# Patient Record
Sex: MALE | Race: WHITE | NOT HISPANIC OR LATINO | Employment: OTHER | URBAN - METROPOLITAN AREA
[De-identification: names, ages, dates, MRNs, and addresses within clinical notes are randomized per-mention and may not be internally consistent; named-entity substitution may affect disease eponyms.]

---

## 2017-03-17 ENCOUNTER — HOSPITAL ENCOUNTER (EMERGENCY)
Facility: CLINIC | Age: 47
Discharge: HOME | End: 2017-03-17
Attending: EMERGENCY MEDICINE | Admitting: EMERGENCY MEDICINE
Payer: MEDICARE

## 2017-03-17 VITALS
HEIGHT: 73 IN | SYSTOLIC BLOOD PRESSURE: 107 MMHG | OXYGEN SATURATION: 97 % | HEART RATE: 81 BPM | WEIGHT: 160.56 LBS | RESPIRATION RATE: 16 BRPM | DIASTOLIC BLOOD PRESSURE: 74 MMHG | TEMPERATURE: 98.2 F | BODY MASS INDEX: 21.28 KG/M2

## 2017-03-17 DIAGNOSIS — H00.014 HORDEOLUM EXTERNUM OF LEFT UPPER EYELID: ICD-10-CM

## 2017-03-17 PROCEDURE — 99283 EMERGENCY DEPT VISIT LOW MDM: CPT | Mod: Z6 | Performed by: EMERGENCY MEDICINE

## 2017-03-17 PROCEDURE — 99282 EMERGENCY DEPT VISIT SF MDM: CPT | Performed by: EMERGENCY MEDICINE

## 2017-03-17 ASSESSMENT — ENCOUNTER SYMPTOMS
EYE REDNESS: 0
COLOR CHANGE: 0
ABDOMINAL PAIN: 0
HEADACHES: 0
NECK STIFFNESS: 0
CONFUSION: 0
DIFFICULTY URINATING: 0
ARTHRALGIAS: 0
FEVER: 0
SHORTNESS OF BREATH: 0

## 2017-03-17 ASSESSMENT — VISUAL ACUITY
OD: 20/40
OS: 20/25

## 2017-03-17 NOTE — ED AVS SNAPSHOT
[facility], Emergency Department    [address]    [facility] [address]    Phone:  [phone]    Fax:  [phone]                                       Brendan Collins   MRN: [id number]    Department:  [facility], Emergency Department   Date of Visit:  3/17/2017           After Visit Summary Signature Page     I have received my discharge instructions, and my questions have been answered. I have discussed any challenges I see with this plan with the nurse or doctor.    ..........................................................................................................................................  Patient/Patient Representative Signature      ..........................................................................................................................................  Patient Representative Print Name and Relationship to Patient    ..................................................               ................................................  Date                                            Time    ..........................................................................................................................................  Reviewed by Signature/Title    ...................................................              ..............................................  Date                                                            Time

## 2017-03-17 NOTE — ED PROVIDER NOTES
"  History     Chief Complaint   Patient presents with     Eye Problem     red and puss filled eyelid     HPI  Brendan Collins is a 46 year old male who presents for evaluation of a right eye problem. The patient presents with his female partner today. The patient reports that over the past couple of weeks he developed a small, red, \"crusty\" lesion on his upper left eye lid. On Wednesday, 2 days ago, the lesion was larger and cystic-appearing. He applied heat to the area and took an acyclovir that he had from previous bouts of cold sores (no history of herpes of the eyes, though). Today, the lesion opened and began to drain purulent material. The patient endorses some blurred vision out of the left eye. The patient denies any trauma to his left eye. He does report a history of stye-like lesions in the right eye, and has had surgery to remove this previously, with resultant right eyelid akinesia.    I have reviewed the Medications, Allergies, Past Medical and Surgical History, and Social History in the Epic system.    No current facility-administered medications for this encounter.      Current Outpatient Prescriptions   Medication     nicotine (NICOTROL) 10 MG/ML SOLN inhalation solution     clonazePAM (KLONOPIN) 0.5 MG tablet     diazepam (VALIUM) 5 MG tablet     sucralfate (CARAFATE) 1 GM tablet     SUMAtriptan Succinate (IMITREX PO)     Cholecalciferol (VITAMIN D3 PO)     buprenorphine HCl-naloxone HCl (SUBOXONE) 8-2 MG film     DiphenhydrAMINE HCl (BENADRYL PO)     Past Medical History   Diagnosis Date     Anxiety      Bipolar 1 disorder (H)      Depressive disorder      Hep C w/o coma, chronic (H)        Past Surgical History   Procedure Laterality Date     Arthroscopy knee  2/1/2012     Procedure:ARTHROSCOPY KNEE; Left Knee Arthroscopy, Partial Lateral Meniscectomy; Surgeon:ELIJAH LY; Location:[facility] OR     Arthroplasty knee Right 11/9/2015     Procedure: ARTHROPLASTY KNEE;  Surgeon: Efrain, " Yajaira MONTE MD;  Location: [facility] OR       Family History   Problem Relation Age of Onset     Glaucoma No family hx of      Macular Degeneration No family hx of      Thyroid Disease No family hx of      Eye Surgery No family hx of      Retinal detachment No family hx of      Amblyopia No family hx of      Strabismus No family hx of        Social History   Substance Use Topics     Smoking status: Current Some Day Smoker     Smokeless tobacco: Never Used     Alcohol use Yes      Comment: on weekends,Last dran was last night        Allergies   Allergen Reactions     Droperidol Other (See Comments)     Spine turns to right and gets stiff, unable to move     Haloperidol Lactate Other (See Comments)     Spine turns to right becomes stiff and is unable to move     Nefazodone Difficulty breathing     Rofecoxib Difficulty breathing     Trazodone Difficulty breathing     Lamictal Starter Rash     Naproxen Nausea and Vomiting     Gabapentin Other (See Comments)     Migraine and shaking       Review of Systems   Constitutional: Negative for fever.   HENT: Negative for congestion.    Eyes: Negative for redness.        Stye-like lesion of left upper eyelid, draining, associated with mild blurry vision   Respiratory: Negative for shortness of breath.    Cardiovascular: Negative for chest pain.   Gastrointestinal: Negative for abdominal pain.   Genitourinary: Negative for difficulty urinating.   Musculoskeletal: Negative for arthralgias and neck stiffness.   Skin: Negative for color change.   Neurological: Negative for headaches.   Psychiatric/Behavioral: Negative for confusion.   All other systems reviewed and are negative.      Physical Exam      Physical Exam   Eyes: Left eye exhibits hordeolum (currently draining).       ED Course     ED Course     Procedures               Labs Ordered and Resulted from Time of ED Arrival Up to the Time of Departure from the ED - No data to display    Assessments & Plan (with Medical Decision  Making)   46-year-old male presents for evaluation of 2 day history of left upper eyelid redness, swelling which apparently spontaneously started draining as he walked into the emergency room.  He has classic findings of a sty which is currently draining without evidence of cellulitis.  I recommended continued use of warm compresses until drainage stops.  I have also recommended follow-up if redness and swelling begins to spread to include adjacent left upper eyelid region.    I have reviewed the nursing notes.    I have reviewed the findings, diagnosis, plan and need for follow up with the patient.    Discharge Medication List as of 3/17/2017 11:06 AM          Final diagnoses:   Hordeolum externum of left upper eyelid     IAshwin, am serving as a trained medical scribe to document services personally performed by Boubacar Lubin MD, based on the provider's statements to me.      Boubacar GENTILE MD, was physically present and have reviewed and verified the accuracy of this note documented by Ashwin Ulrich.    3/17/2017   [facility], [place], EMERGENCY DEPARTMENT     Boubacar Lubin MD  03/17/17 5123

## 2017-03-17 NOTE — DISCHARGE INSTRUCTIONS
Sty (or Stye)  A sty is an infection of the oil gland of the eyelid. It may develop into a small pocket of pus (abscess). This can cause pain, redness, and swelling. In early stages, styes are treated with antibiotic cream, eye drops, or warm packs (small towels soaked in warm water). More severe cases may need to be opened and drained by a health care provider.  Home care    Eye drops or ointment are usually prescribed to treat the infection. Use these as directed.     Artificial tears may also be used to lubricate the eye and make it more comfortable. These may be purchased without a prescription.     Talk to your health care provider before using any over-the-counter treatment for a sty.    Apply a warm, damp towel to the affected eye for at least 5 minutes, 3 to 4 times a day for a week. Warm compresses open the pores and speed the healing. If the compresses are too hot, they may burn your eyelid.    Sometimes the sty will drain with this treatment alone. If this happens, continue the antibiotic until all the redness and swelling are gone.    Wash your hands before and after touching the infected eye to avoid spreading the infection.    Do not squeeze or try to puncture the sty.  Follow-up care  Follow up with your health care provider, or as advised.   When to seek medical advice  Call your health care provider right away if you have:    Increase in swelling or redness around the eyelid after 48 to 72 hours    Increase in eye pain or the eyelid blisters    Increase in warmth--the eyelid feels hot    Drainage of blood or thick pus from the sty    Blister on the eyelid    Inability to open the eyelid due to swelling    Fever    1 degree above your normal temperature lasting for 24 to 48 hours, or    Whatever your health care provider told you to report based on your medical condition    Vision changes    Headache or stiff neck    Recurrence of the sty    7289-2211 [employer]. [address], [place] [address]. All rights reserved. This information is not intended as a substitute for professional medical care. Always follow your healthcare professional's instructions.

## 2017-03-17 NOTE — ED AVS SNAPSHOT
[facility], Emergency Department    [address]    [facility] [address]    Phone:  [phone]    Fax:  [phone]                                       Brendan Collins   MRN: [id number]    Department:  [facility], Emergency Department   Date of Visit:  3/17/2017           Patient Information     Date Of Birth          1970        Your diagnoses for this visit were:     Hordeolum externum of left upper eyelid        You were seen by Boubacar Lubin MD.      Follow-up Information     Follow up with Kiah Hightower MD.    Specialty:  Family Practice    Contact information:    [facility]  [address] [id number] [phone]          Discharge Instructions         Sty (or Stye)  A sty is an infection of the oil gland of the eyelid. It may develop into a small pocket of pus (abscess). This can cause pain, redness, and swelling. In early stages, styes are treated with antibiotic cream, eye drops, or warm packs (small towels soaked in warm water). More severe cases may need to be opened and drained by a health care provider.  Home care    Eye drops or ointment are usually prescribed to treat the infection. Use these as directed.     Artificial tears may also be used to lubricate the eye and make it more comfortable. These may be purchased without a prescription.     Talk to your health care provider before using any over-the-counter treatment for a sty.    Apply a warm, damp towel to the affected eye for at least 5 minutes, 3 to 4 times a day for a week. Warm compresses open the pores and speed the healing. If the compresses are too hot, they may burn your eyelid.    Sometimes the sty will drain with this treatment alone. If this happens, continue the antibiotic until all the redness and swelling are gone.    Wash your hands before and after touching the infected eye to avoid spreading the infection.    Do not squeeze or try to puncture the sty.  Follow-up  care  Follow up with your health care provider, or as advised.   When to seek medical advice  Call your health care provider right away if you have:    Increase in swelling or redness around the eyelid after 48 to 72 hours    Increase in eye pain or the eyelid blisters    Increase in warmth--the eyelid feels hot    Drainage of blood or thick pus from the sty    Blister on the eyelid    Inability to open the eyelid due to swelling    Fever    1 degree above your normal temperature lasting for 24 to 48 hours, or    Whatever your health care provider told you to report based on your medical condition    Vision changes    Headache or stiff neck    Recurrence of the sty    0177-9250 [employer]. [address] [place] [address]. All rights reserved. This information is not intended as a substitute for professional medical care. Always follow your healthcare professional's instructions.          24 Hour Appointment Hotline       To make an appointment at any [facility], call [phone] ([phone]). If you don't have a family doctor or clinic, we will help you find one. [facility] [place] clinics are conveniently located to serve the needs of you and your family.             Review of your medicines      Our records show that you are taking the medicines listed below. If these are incorrect, please call your family doctor or clinic.        Dose / Directions Last dose taken    BENADRYL PO   Dose:   mg        Take  mg by mouth nightly as needed   Refills:  0        buprenorphine HCl-naloxone HCl 8-2 MG per film   Commonly known as:  SUBOXONE   Dose:  2 Film        Place 2 Film under the tongue daily   Refills:  0        clonazePAM 0.5 MG tablet   Commonly known as:  klonoPIN   Dose:  0.5 mg        Take 0.5 mg by mouth nightly as needed (sleep)   Refills:  0        diazepam 5 MG tablet   Commonly known as:  VALIUM   Dose:  5 mg        Take 5 mg by mouth every 8 hours as needed for  "anxiety   Refills:  0        IMITREX PO   Dose:  100 mg        Take 100 mg by mouth daily as needed for migraine (May take 1 more tablet 2 hours after first dose if no relief)   Refills:  0        nicotine 10 MG/ML Soln inhalation solution   Commonly known as:  NICOTROL   Dose:  1 spray   Quantity:  1 Bottle        Spray 1 spray in nostril every hour as needed for smoking cessation   Refills:  1        sucralfate 1 GM tablet   Commonly known as:  CARAFATE   Dose:  1 g        Take 1 g by mouth 4 times daily as needed   Refills:  0        VITAMIN D3 PO   Dose:  5000 Units        Take 5,000 Units by mouth daily   Refills:  0                Orders Needing Specimen Collection     None      Pending Results     No orders found from 3/15/2017 to 3/18/2017.            Pending Culture Results     No orders found from 3/15/2017 to 3/18/2017.            Thank you for choosing [facility]       Thank you for choosing [facility] for your care. Our goal is always to provide you with excellent care. Hearing back from our patients is one way we can continue to improve our services. Please take a few minutes to complete the written survey that you may receive in the mail after you visit with us. Thank you!        [employer] Information     [employer] lets you send messages to your doctor, view your test results, renew your prescriptions, schedule appointments and more. To sign up, go to [email or web address] . Click on \"Log in\" on the left side of the screen, which will take you to the Welcome page. Then click on \"Sign up Now\" on the right side of the page.     You will be asked to enter the access code listed below, as well as some personal information. Please follow the directions to create your username and password.     Your access code is: [email or web address]  Expires: 3/17/2017  2:37 PM     Your access code will  in 90 days. If you need help or a new code, please call your [facility] clinic or [phone].        Care EveryWhere ID     " This is your Care EveryWhere ID. This could be used by other organizations to access your [facility] medical records  [id number]        After Visit Summary       This is your record. Keep this with you and show to your community pharmacist(s) and doctor(s) at your next visit.

## 2017-05-24 ENCOUNTER — HOSPITAL ENCOUNTER (INPATIENT)
Facility: CLINIC | Age: 47
LOS: 8 days | Discharge: HOME | DRG: 885 | End: 2017-06-02
Attending: PSYCHIATRY & NEUROLOGY | Admitting: PSYCHIATRY & NEUROLOGY
Payer: MEDICARE

## 2017-05-24 DIAGNOSIS — M25.562 CHRONIC PAIN OF BOTH KNEES: Primary | ICD-10-CM

## 2017-05-24 DIAGNOSIS — M25.561 CHRONIC PAIN OF BOTH KNEES: Primary | ICD-10-CM

## 2017-05-24 DIAGNOSIS — S06.9X0S TRAUMATIC BRAIN INJURY, WITHOUT LOSS OF CONSCIOUSNESS, SEQUELA (H): ICD-10-CM

## 2017-05-24 DIAGNOSIS — F43.10 PTSD (POST-TRAUMATIC STRESS DISORDER): ICD-10-CM

## 2017-05-24 DIAGNOSIS — G89.29 CHRONIC PAIN OF BOTH KNEES: Primary | ICD-10-CM

## 2017-05-24 DIAGNOSIS — F19.20 CHEMICAL DEPENDENCY (H): ICD-10-CM

## 2017-05-24 DIAGNOSIS — K59.01 SLOW TRANSIT CONSTIPATION: ICD-10-CM

## 2017-05-24 LAB
AMPHETAMINES UR QL SCN: ABNORMAL
BARBITURATES UR QL: ABNORMAL
BENZODIAZ UR QL: ABNORMAL
CANNABINOIDS UR QL SCN: ABNORMAL
COCAINE UR QL: ABNORMAL
ETHANOL UR QL SCN: ABNORMAL
OPIATES UR QL SCN: ABNORMAL

## 2017-05-24 PROCEDURE — 99285 EMERGENCY DEPT VISIT HI MDM: CPT | Mod: Z6 | Performed by: PSYCHIATRY & NEUROLOGY

## 2017-05-24 PROCEDURE — 99285 EMERGENCY DEPT VISIT HI MDM: CPT | Mod: 25 | Performed by: PSYCHIATRY & NEUROLOGY

## 2017-05-24 PROCEDURE — 90791 PSYCH DIAGNOSTIC EVALUATION: CPT

## 2017-05-24 PROCEDURE — 80307 DRUG TEST PRSMV CHEM ANLYZR: CPT | Performed by: PSYCHIATRY & NEUROLOGY

## 2017-05-24 PROCEDURE — 80320 DRUG SCREEN QUANTALCOHOLS: CPT | Performed by: PSYCHIATRY & NEUROLOGY

## 2017-05-24 ASSESSMENT — ENCOUNTER SYMPTOMS
CARDIOVASCULAR NEGATIVE: 1
CONSTITUTIONAL NEGATIVE: 1
MUSCULOSKELETAL NEGATIVE: 1
NERVOUS/ANXIOUS: 1
SLEEP DISTURBANCE: 1
RESPIRATORY NEGATIVE: 1
HEMATOLOGIC/LYMPHATIC NEGATIVE: 1
CONFUSION: 1
EYES NEGATIVE: 1
DECREASED CONCENTRATION: 1
GASTROINTESTINAL NEGATIVE: 1

## 2017-05-24 NOTE — IP AVS SNAPSHOT
[address] [address]    Phone:  [phone]                                       After Visit Summary   5/24/2017    Brendan Collins    MRN: [id number]           After Visit Summary Signature Page     I have received my discharge instructions, and my questions have been answered. I have discussed any challenges I see with this plan with the nurse or doctor.    ..........................................................................................................................................  Patient/Patient Representative Signature      ..........................................................................................................................................  Patient Representative Print Name and Relationship to Patient    ..................................................               ................................................  Date                                            Time    ..........................................................................................................................................  Reviewed by Signature/Title    ...................................................              ..............................................  Date                                                            Time

## 2017-05-24 NOTE — IP AVS SNAPSHOT
MRN:[id number]                      After Visit Summary   5/24/2017    Brendan Collins    MRN: [id number]           Thank you!     Thank you for choosing [facility] for your care. Our goal is always to provide you with excellent care.        Patient Information     Date Of Birth          1970        Designated Caregiver       Most Recent Value    Caregiver    Will someone help with your care after discharge? no      About your hospital stay     You were admitted on:  May 25, 2017 You last received care in the:  [facility]    You were discharged on:  June 2, 2017       Who to Call     For medical emergencies, please call 911.  For non-urgent questions about your medical care, please call your primary care provider or clinic, [phone]          Attending Provider     Provider Specialty    Tung Tavarez MD Psychiatry    [facility], Alex CLAROS MD Psychiatry    [facility], Herb Tamayo MD Psychiatry       Primary Care Provider Office Phone # Fax #    Kiah Hightower [phone]      Additional Services     NEUROPSYCHOLOGY REFERRAL       Your provider has referred you to:    [facility]    All scheduling is subject to the client's specific insurance plan & benefits, provider/location availability, and provider clinical specialities.  Please arrive 15 minutes early for your first appointment and bring your completed paperwork.    Please be aware that coverage of these services is subject to the terms and limitations of your health insurance plan.  Call member services at your health plan with any benefit or coverage questions.    Please bring the following to your appointment:  >>   Any x-rays, CTs or MRIs which have been performed.  Contact the facility where they were done to arrange for  prior to your scheduled appointment.  Any new CT, MRI or other procedures ordered by your specialist must be performed at a [facility] facility or coordinated by your  clinic's referral office.    >>   List of current medications   >>   This referral request   >>   Any documents/labs given to you for this referral                  Further instructions from your care team       Behavioral Discharge Planning and Instructions    Summary:  You presented to the Emergency room with memory loss and exhibiting dangerous behaviors. You were evaluated by the emergency room and subsequently admitted to [facility] for monitoring, assessment and stabilization.  While admitted your symptoms improved. You are now denying thoughts of harming yourself or others and are being discharged to follow up with outpatient providers.    Main Diagnosis:   1.  Diagnosis of bipolar.  Mood stabilizers did not work.      2.  Chemical dependency; polymorphic.    3.  Cluster B personality.    4.  PTSD.    5.  Traumatic brain injury with multiple concussions.    Major Treatments, Procedures and Findings: You had the opportunity to participate in a therapeutic milieu, you were also given the opportunity to participate in groups to increase positive coping skills, you were evaluated and your mental health was assessed and managed by a psychiatrist, your medications were evaluated and adjusted as needed. Follow up appointments were made or referrals were given for you to make appointments. Various resources regarding your mental health symptoms were provided.    Symptoms to Report: Feeling more aggressive, increased confusion, losing more sleep, mood getting worse or thoughts of suicide    Lifestyle Adjustment: Adjust your lifestyle to get enough sleep, relaxation, exercise and  good nutrition. Continue to develop healthy coping skills to decrease stress and promote a healthy living environment. No use of alcohol, illegal drugs or addictive medications other than what is currently prescribed. Attend all your appointments and take your medications as prescribed.    Psychiatry Follow-up: Monday, June 5th at 9:00 am    [facility]   [address] [place] [address]. [phone] Fax: [phone]  *[facility] was unable to switch your provider to Latosha Cooper as we discussed, so at this appointment you need to request a new psychiatric provider if you would still like to change providers.                                                    Therapy Follow up: Wednesday, June 14th at 9:00 am and Wednesday, June 21st at 9:00 am   [facility]  [address]  [address] [place] [address] [phone]     Initial Neuropsychological Evaluation: Wednesday, July 14th at 8:30 am   [facility]   Dr. Rush Islas   [address] [place] [address] [phone]   *Psychological clinic is located on the 2nd floor     Resources:   Crisis Intervention: [phone] or 966-197-7204 (TTY: 117.619.7167).  Call anytime for help.  National [place] on Mental Illness (www.[place].oseas.org): 340.290.7364 or 874-410-6981.  Alcoholics Anonymous (www.alcoholics-anonymous.org): Check your phone book for your local chapter.  Suicide Awareness Voices of Education (SAVE) (www.save.org): 290-289-MLLI (0930)  National Suicide Prevention Line (www.mentalhealthmn.org): 168-839-SWAS (0733)  Mental Health Consumer/Survivor Network of [place] (www.mhcsn.net): 231.854.7771 or 697-085-2054  Mental Health Association of [place] (www.mentalhealth.org): 445.322.3022 or 754-851-9471  **[facility] ADULT MENTAL HEALTH CRISIS COPE TEAM: [phone]**    General Medication Instructions:   See your medication sheet(s) for instructions.   Take all medicines as directed.  Make no changes unless your doctor suggests them.   Go to all your doctor visits.  Be sure to have all your required lab tests. This way, your medicines can be refilled on time.  Do not use any drugs not prescribed by your doctor.  Avoid alcohol.    The treatment team has appreciated the opportunity to work with you.  We  "wish you the best in the future. If you have any questions or concerns our unit number is [phone].    Pending Results     No orders found from 2017 to 2017.            Statement of Approval     Ordered          17 1133  I have reviewed and agree with all the recommendations and orders detailed in this document.  EFFECTIVE NOW     Approved and electronically signed by:  Herb Buchanan MD           17 4423  I have reviewed and agree with all the recommendations and orders detailed in this document.  EFFECTIVE NOW     Approved and electronically signed by:  Herb Buchanan MD             Admission Information     Date & Time Provider Department Dept. Phone    2017 Herb Buchanan MD  [facility] [phone]      Your Vitals Were     Blood Pressure Pulse Temperature Respirations Height Weight    145/96 73 97.4  F (36.3  C) 16 1.854 m (6' 1\") 68 kg (149 lb 14.4 oz)    Pulse Oximetry BMI (Body Mass Index)                100% 19.78 kg/m2          [employer] Information     H5 lets you send messages to your doctor, view your test results, renew your prescriptions, schedule appointments and more. To sign up, go to [email or web address] . Click on \"Log in\" on the left side of the screen, which will take you to the Welcome page. Then click on \"Sign up Now\" on the right side of the page.     You will be asked to enter the access code listed below, as well as some personal information. Please follow the directions to create your username and password.     Your access code is: [email or web address]  Expires: 2017  9:50 AM     Your access code will  in 90 days. If you need help or a new code, please call your [facility] clinic or [phone].        Care EveryWhere ID     This is your Care EveryWhere ID. This could be used by other organizations to access your [facility] medical records  [id number]           Review of your medicines      START taking        Dose / Directions    " aspirin  MG EC tablet   Used for:  Chronic pain of both knees        Dose:  650 mg   Take 2 tablets (650 mg) by mouth 2 times daily as needed for moderate pain   Quantity:  60 tablet   Refills:  1       diphenhydrAMINE-zinc acetate cream   Commonly known as:  BENADRYL        Apply topically 3 times daily as needed for itching   Refills:  0       hydrOXYzine 25 MG tablet   Commonly known as:  ATARAX   Used for:  PTSD (post-traumatic stress disorder)        Dose:  25-50 mg   Take 1-2 tablets (25-50 mg) by mouth every 4 hours as needed for itching or anxiety   Quantity:  60 tablet   Refills:  1         CONTINUE these medicines which may have CHANGED, or have new prescriptions. If we are uncertain of the size of tablets/capsules you have at home, strength may be listed as something that might have changed.        Dose / Directions    buprenorphine 8 MG Subl sublingual tablet   Commonly known as:  SUBUTEX   This may have changed:    - medication strength  - additional instructions        Dose:  16 mg   Place 2 tablets (16 mg) under the tongue daily   Quantity:  60 each   Refills:  0         CONTINUE these medicines which have NOT CHANGED        Dose / Directions    BENADRYL PO        Dose:  25-75 mg   Take 25-75 mg by mouth nightly as needed   Refills:  0       diazepam 5 MG tablet   Commonly known as:  VALIUM   Used for:  PTSD (post-traumatic stress disorder)        Dose:  5 mg   Take 1 tablet (5 mg) by mouth every 8 hours as needed for anxiety   Quantity:  14 tablet   Refills:  0       IMITREX PO        Dose:  100 mg   Take 100 mg by mouth daily as needed for migraine (May take 1 more tablet 2 hours after first dose if no relief)   Refills:  0       nicotine 10 MG/ML Soln inhalation solution   Commonly known as:  NICOTROL   Used for:  Nicotine use disorder        Dose:  1 spray   Spray 1 spray in nostril every hour as needed for smoking cessation   Quantity:  1 Bottle   Refills:  1       senna-docusate 8.6-50 MG  per tablet   Commonly known as:  SENOKOT-S;PERICOLACE   Used for:  Slow transit constipation        Dose:  2 tablet   Take 2 tablets by mouth daily   Quantity:  60 tablet   Refills:  1       sucralfate 1 GM tablet   Commonly known as:  CARAFATE        Dose:  1 g   Take 1 g by mouth 4 times daily as needed   Refills:  0       VITAMIN D3 PO        Dose:  5000 Units   Take 5,000 Units by mouth three times a week   Refills:  0         STOP taking     clonazePAM 0.5 MG tablet   Commonly known as:  klonoPIN                Where to get your medicines      These medications were sent to [facility] [place], [place] - [address]  [address], [facility] [address]     Phone:  [phone]     aspirin  MG EC tablet    hydrOXYzine 25 MG tablet    senna-docusate 8.6-50 MG per tablet         Some of these will need a paper prescription and others can be bought over the counter. Ask your nurse if you have questions.     Bring a paper prescription for each of these medications     diazepam 5 MG tablet                Protect others around you: Learn how to safely use, store and throw away your medicines at www.disposemymeds.org.             Medication List: This is a list of all your medications and when to take them. Check marks below indicate your daily home schedule. Keep this list as a reference.      Medications           Morning Afternoon Evening Bedtime As Needed    aspirin  MG EC tablet   Take 2 tablets (650 mg) by mouth 2 times daily as needed for moderate pain   Last time this was given:  650 mg on 6/2/2017 12:10 AM                                BENADRYL PO   Take 25-75 mg by mouth nightly as needed   Last time this was given:  75 mg on 6/1/2017  9:14 PM                                buprenorphine 8 MG Subl sublingual tablet   Commonly known as:  SUBUTEX   Place 2 tablets (16 mg) under the tongue daily   Last time this was given:  16 mg on 6/2/2017  8:20 AM                                 diazepam 5 MG tablet   Commonly known as:  VALIUM   Take 1 tablet (5 mg) by mouth every 8 hours as needed for anxiety   Last time this was given:  5 mg on 6/2/2017  8:20 AM                                diphenhydrAMINE-zinc acetate cream   Commonly known as:  BENADRYL   Apply topically 3 times daily as needed for itching                                hydrOXYzine 25 MG tablet   Commonly known as:  ATARAX   Take 1-2 tablets (25-50 mg) by mouth every 4 hours as needed for itching or anxiety   Last time this was given:  25 mg on 6/2/2017 10:17 AM                                IMITREX PO   Take 100 mg by mouth daily as needed for migraine (May take 1 more tablet 2 hours after first dose if no relief)   Last time this was given:  100 mg on 5/29/2017  3:41 AM                                nicotine 10 MG/ML Soln inhalation solution   Commonly known as:  NICOTROL   Spray 1 spray in nostril every hour as needed for smoking cessation                                senna-docusate 8.6-50 MG per tablet   Commonly known as:  SENOKOT-S;PERICOLACE   Take 2 tablets by mouth daily   Last time this was given:  1 tablet on 6/2/2017  8:20 AM                                sucralfate 1 GM tablet   Commonly known as:  CARAFATE   Take 1 g by mouth 4 times daily as needed                                VITAMIN D3 PO   Take 5,000 Units by mouth three times a week   Last time this was given:  5,000 Units on 6/2/2017  8:20 AM

## 2017-05-25 ENCOUNTER — APPOINTMENT (OUTPATIENT)
Dept: GENERAL RADIOLOGY | Facility: CLINIC | Age: 47
DRG: 885 | End: 2017-05-25
Attending: PHYSICIAN ASSISTANT
Payer: MEDICARE

## 2017-05-25 PROBLEM — F31.9 BIPOLAR AFFECTIVE DISORDER (H): Status: ACTIVE | Noted: 2017-05-25

## 2017-05-25 PROCEDURE — 99223 1ST HOSP IP/OBS HIGH 75: CPT | Mod: AI | Performed by: PSYCHIATRY & NEUROLOGY

## 2017-05-25 PROCEDURE — 25000132 ZZH RX MED GY IP 250 OP 250 PS 637: Mod: GY | Performed by: PSYCHIATRY & NEUROLOGY

## 2017-05-25 PROCEDURE — 99222 1ST HOSP IP/OBS MODERATE 55: CPT | Performed by: PHYSICIAN ASSISTANT

## 2017-05-25 PROCEDURE — A9270 NON-COVERED ITEM OR SERVICE: HCPCS | Mod: GY | Performed by: PSYCHIATRY & NEUROLOGY

## 2017-05-25 PROCEDURE — 73620 X-RAY EXAM OF FOOT: CPT | Mod: RT

## 2017-05-25 PROCEDURE — 99207 ZZC CONSULT E&M CHANGED TO INITIAL LEVEL: CPT | Performed by: PHYSICIAN ASSISTANT

## 2017-05-25 PROCEDURE — 73600 X-RAY EXAM OF ANKLE: CPT | Mod: RT

## 2017-05-25 PROCEDURE — 12400001 ZZH R&B MH UMMC

## 2017-05-25 PROCEDURE — 90853 GROUP PSYCHOTHERAPY: CPT

## 2017-05-25 RX ORDER — DIPHENHYDRAMINE HYDROCHLORIDE, ZINC ACETATE 2; .1 G/100G; G/100G
CREAM TOPICAL 3 TIMES DAILY PRN
Status: DISCONTINUED | OUTPATIENT
Start: 2017-05-25 | End: 2017-06-02 | Stop reason: HOSPADM

## 2017-05-25 RX ORDER — ACETAMINOPHEN 325 MG/1
325 TABLET ORAL EVERY 6 HOURS PRN
Status: DISCONTINUED | OUTPATIENT
Start: 2017-05-25 | End: 2017-06-02 | Stop reason: HOSPADM

## 2017-05-25 RX ORDER — DIAZEPAM 5 MG
5 TABLET ORAL EVERY 8 HOURS PRN
Status: DISCONTINUED | OUTPATIENT
Start: 2017-05-25 | End: 2017-05-30

## 2017-05-25 RX ORDER — OLANZAPINE 10 MG/2ML
10 INJECTION, POWDER, FOR SOLUTION INTRAMUSCULAR
Status: DISCONTINUED | OUTPATIENT
Start: 2017-05-25 | End: 2017-06-02 | Stop reason: HOSPADM

## 2017-05-25 RX ORDER — AMOXICILLIN 250 MG
2 CAPSULE ORAL DAILY
Status: DISCONTINUED | OUTPATIENT
Start: 2017-05-25 | End: 2017-05-29

## 2017-05-25 RX ORDER — HYDROXYZINE HYDROCHLORIDE 25 MG/1
25-50 TABLET, FILM COATED ORAL EVERY 4 HOURS PRN
Status: DISCONTINUED | OUTPATIENT
Start: 2017-05-25 | End: 2017-05-25

## 2017-05-25 RX ORDER — SUMATRIPTAN 50 MG/1
100 TABLET, FILM COATED ORAL DAILY PRN
Status: DISCONTINUED | OUTPATIENT
Start: 2017-05-25 | End: 2017-06-02 | Stop reason: HOSPADM

## 2017-05-25 RX ORDER — ZIPRASIDONE HYDROCHLORIDE 20 MG/1
20 CAPSULE ORAL 2 TIMES DAILY WITH MEALS
Status: DISCONTINUED | OUTPATIENT
Start: 2017-05-25 | End: 2017-05-26

## 2017-05-25 RX ORDER — NALOXONE HYDROCHLORIDE 0.4 MG/ML
.1-.4 INJECTION, SOLUTION INTRAMUSCULAR; INTRAVENOUS; SUBCUTANEOUS
Status: DISCONTINUED | OUTPATIENT
Start: 2017-05-25 | End: 2017-06-02 | Stop reason: HOSPADM

## 2017-05-25 RX ORDER — DIPHENHYDRAMINE HCL 25 MG
25-75 CAPSULE ORAL
Status: DISCONTINUED | OUTPATIENT
Start: 2017-05-25 | End: 2017-06-02 | Stop reason: HOSPADM

## 2017-05-25 RX ORDER — BUPRENORPHINE 8 MG/1
16 TABLET SUBLINGUAL DAILY
Status: DISCONTINUED | OUTPATIENT
Start: 2017-05-25 | End: 2017-06-02 | Stop reason: HOSPADM

## 2017-05-25 RX ORDER — SUCRALFATE 1 G/1
1 TABLET ORAL 4 TIMES DAILY PRN
Status: DISCONTINUED | OUTPATIENT
Start: 2017-05-25 | End: 2017-06-02 | Stop reason: HOSPADM

## 2017-05-25 RX ORDER — AMOXICILLIN 250 MG
2 CAPSULE ORAL DAILY
Status: ON HOLD | COMMUNITY
End: 2017-06-02

## 2017-05-25 RX ORDER — HYDROXYZINE HYDROCHLORIDE 25 MG/1
25-50 TABLET, FILM COATED ORAL EVERY 4 HOURS PRN
Status: DISCONTINUED | OUTPATIENT
Start: 2017-05-25 | End: 2017-06-02 | Stop reason: HOSPADM

## 2017-05-25 RX ADMIN — ZIPRASIDONE HCL 20 MG: 20 CAPSULE ORAL at 16:24

## 2017-05-25 RX ADMIN — NICOTINE POLACRILEX 4 MG: 2 GUM, CHEWING ORAL at 19:20

## 2017-05-25 RX ADMIN — NICOTINE POLACRILEX 4 MG: 2 GUM, CHEWING ORAL at 10:04

## 2017-05-25 RX ADMIN — NICOTINE POLACRILEX 4 MG: 2 GUM, CHEWING ORAL at 16:23

## 2017-05-25 RX ADMIN — HYDROXYZINE HYDROCHLORIDE 50 MG: 25 TABLET ORAL at 05:21

## 2017-05-25 RX ADMIN — SENNOSIDES AND DOCUSATE SODIUM 2 TABLET: 8.6; 5 TABLET ORAL at 14:19

## 2017-05-25 RX ADMIN — NICOTINE POLACRILEX 4 MG: 2 GUM, CHEWING ORAL at 06:43

## 2017-05-25 RX ADMIN — DIAZEPAM 5 MG: 5 TABLET ORAL at 14:19

## 2017-05-25 RX ADMIN — DIAZEPAM 5 MG: 5 TABLET ORAL at 23:56

## 2017-05-25 RX ADMIN — NICOTINE POLACRILEX 4 MG: 2 GUM, CHEWING ORAL at 12:18

## 2017-05-25 RX ADMIN — BUPRENORPHINE HCL 16 MG: 8 TABLET SUBLINGUAL at 15:19

## 2017-05-25 ASSESSMENT — ACTIVITIES OF DAILY LIVING (ADL)
LAUNDRY: WITH SUPERVISION
ORAL_HYGIENE: INDEPENDENT
GROOMING: INDEPENDENT
DRESS: INDEPENDENT

## 2017-05-25 NOTE — ED NOTES
"Suicidal with plan but no specific plans- states \"I can come up with several plan\". Pt reports visual hallucination. Girl friend reports not taking his medications. [facility]  "
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
## 2017-05-25 NOTE — PROGRESS NOTES
Patient wandered in and out of groups x 2. Declined participation. Noted he was unable to stay due to back pain and being chilled. Pt was given and completed a written self assessment. OT purpose was explained with a value of having involvement in tx plan, and provided options to meet self identified goals. Will assess further in the areas of organization, problem solving, and concentration.
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
## 2017-05-25 NOTE — PROGRESS NOTES
Spoke with patient's girlfriend (Kiah ALEXANDRA [phone]) who indicated patient has received an 30 day notice of eviction from his apartment and is banned from her apartment building as well.

## 2017-05-25 NOTE — PROGRESS NOTES
05/25/17 0054   Patient Belongings   Did you bring any home meds/supplements to the hospital?  No   Patient Belongings other (see comments)   Disposition of Belongings In locker   Belongings Search Yes   Clothing Search Yes   Second Staff Pedro COBOS     In locker:  2 sets of pants  Bracelet  Cell Phone  Shoes  Shirt  Jacket  Water Bottle  Set of keys    ADMISSION:  I am responsible for any personal items that are not sent to the safe or pharmacy. [facility] is not responsible for loss, theft or damage of any property in my possession.    Patient Signature _____________________ Date/Time _____________________    Staff Signature _______________________ Date/Time _____________________    2nd Staff person, if patient is unable/unwilling to sign  ___________________________________ Date/Time _____________________    DISCHARGE:  My personal items have been returned to me.   Patient Signature _____________________ Date/Time _____________________

## 2017-05-25 NOTE — ED NOTES
Patient arrives to [facility]. Psych Associate explains process, gives patient urine cup and questionnaire. Patient told about meeting with Mental Health  and Psychiatrist. Patient told about 2-5 hour time frame for complete evaluation.

## 2017-05-25 NOTE — PROGRESS NOTES
"Writer received another phone call from Kiah, Pt's friend who expressed concern about not being included in Pt's meeting with the doctor earlier today. Writer stated it was communicated to Pt's doctor she would like to speak with him, and due to overwhelming number of admissions today, he would call her tomorrow to discuss Pt's admission. Kiah was angry, and stated \"No! Not good enough! That is against his rights, I am a member of the [facility] legislative board!\" Writer encouraged Kiah to call Patient Relations regarding this matter, to which she stated \"I already have!!! Thank you\". Writer thanked Kiah, and apologized for any miscommunication.   "
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
## 2017-05-25 NOTE — PROGRESS NOTES
SPIRITUAL HEALTH SERVICES  SPIRITUAL ASSESSMENT Progress Note  [facility]     REFERRAL SOURCE: Patient request for emotional support through Epic consult.    After review of patient's chart I will plan to follow-up with Cheondoism tomorrow.    PLAN: I will visit Cheondoism tomorrow morning.  [facility] remains available for the duration of Cheondoism's hospitalization.     Jeramie Larson MDiv.  Chaplain Resident  Pager [phone]
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
## 2017-05-25 NOTE — PROGRESS NOTES
05/25/17 1500   Behavioral Health   Hallucinations denies / not responding to hallucinations   Thinking poor concentration   Orientation time: oriented;date: oriented;place: oriented;person: oriented   Memory short term   Insight insight appropriate to situation   Judgement impaired   Eye Contact at examiner   Affect blunted, flat   Mood anxious   Physical Appearance/Attire neat;attire appropriate to age and situation;appears stated age   Hygiene well groomed   Suicidality other (see comments)  (None Stated)   Self Injury other (see comment)  (None Stated)   Activity other (see comment)  (groups, check in, social with others)   Speech clear;coherent   Medication Sensitivity no observed side effects;no stated side effects   Psychomotor / Gait steady;balanced     Pt.'s goal was to talk to doctor. Pt.'s discharge plan was not sure. Pt. Attended and participated in groups at times. Pt. Was social with others. Pt. Appeared to have poor concentration at times. Pt.'s right foot appeared swollen. Pt. Had good appetite, but could be improved. Pt. Was encouraged to attend lunch. Pt.'s effective approaches was thinking medications would help. Pt. Stated that he isn't taking medications. Pt. Appeared to have short-term memory regarding previous medication use.
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
## 2017-05-26 ENCOUNTER — APPOINTMENT (OUTPATIENT)
Dept: PHYSICAL THERAPY | Facility: CLINIC | Age: 47
DRG: 885 | End: 2017-05-26
Attending: PHYSICIAN ASSISTANT
Payer: MEDICARE

## 2017-05-26 LAB
ALBUMIN SERPL-MCNC: 4.2 G/DL (ref 3.4–5)
ALP SERPL-CCNC: 55 U/L (ref 40–150)
ALT SERPL W P-5'-P-CCNC: 83 U/L (ref 0–70)
ANION GAP SERPL CALCULATED.3IONS-SCNC: 8 MMOL/L (ref 3–14)
AST SERPL W P-5'-P-CCNC: 90 U/L (ref 0–45)
BILIRUB SERPL-MCNC: 1.1 MG/DL (ref 0.2–1.3)
BUN SERPL-MCNC: 5 MG/DL (ref 7–30)
CALCIUM SERPL-MCNC: 9.4 MG/DL (ref 8.5–10.1)
CHLORIDE SERPL-SCNC: 105 MMOL/L (ref 94–109)
CO2 SERPL-SCNC: 29 MMOL/L (ref 20–32)
CREAT SERPL-MCNC: 0.6 MG/DL (ref 0.66–1.25)
ERYTHROCYTE [DISTWIDTH] IN BLOOD BY AUTOMATED COUNT: 12.9 % (ref 10–15)
GFR SERPL CREATININE-BSD FRML MDRD: ABNORMAL ML/MIN/1.7M2
GLUCOSE SERPL-MCNC: 123 MG/DL (ref 70–99)
HCT VFR BLD AUTO: 42.2 % (ref 40–53)
HGB BLD-MCNC: 14.8 G/DL (ref 13.3–17.7)
MCH RBC QN AUTO: 33.1 PG (ref 26.5–33)
MCHC RBC AUTO-ENTMCNC: 35.1 G/DL (ref 31.5–36.5)
MCV RBC AUTO: 94 FL (ref 78–100)
PLATELET # BLD AUTO: 143 10E9/L (ref 150–450)
POTASSIUM SERPL-SCNC: 3.7 MMOL/L (ref 3.4–5.3)
PROT SERPL-MCNC: 8.1 G/DL (ref 6.8–8.8)
RBC # BLD AUTO: 4.47 10E12/L (ref 4.4–5.9)
SODIUM SERPL-SCNC: 142 MMOL/L (ref 133–144)
URATE SERPL-MCNC: 3.4 MG/DL (ref 3.5–7.2)
WBC # BLD AUTO: 3.6 10E9/L (ref 4–11)

## 2017-05-26 PROCEDURE — 97110 THERAPEUTIC EXERCISES: CPT | Mod: GP

## 2017-05-26 PROCEDURE — 99233 SBSQ HOSP IP/OBS HIGH 50: CPT | Performed by: PSYCHIATRY & NEUROLOGY

## 2017-05-26 PROCEDURE — 80053 COMPREHEN METABOLIC PANEL: CPT | Performed by: PHYSICIAN ASSISTANT

## 2017-05-26 PROCEDURE — 40000193 ZZH STATISTIC PT WARD VISIT

## 2017-05-26 PROCEDURE — 84550 ASSAY OF BLOOD/URIC ACID: CPT | Performed by: PHYSICIAN ASSISTANT

## 2017-05-26 PROCEDURE — 97162 PT EVAL MOD COMPLEX 30 MIN: CPT | Mod: GP

## 2017-05-26 PROCEDURE — 25000132 ZZH RX MED GY IP 250 OP 250 PS 637: Mod: GY | Performed by: PSYCHIATRY & NEUROLOGY

## 2017-05-26 PROCEDURE — A9270 NON-COVERED ITEM OR SERVICE: HCPCS | Mod: GY | Performed by: PSYCHIATRY & NEUROLOGY

## 2017-05-26 PROCEDURE — 12400001 ZZH R&B MH UMMC

## 2017-05-26 PROCEDURE — 97112 NEUROMUSCULAR REEDUCATION: CPT | Mod: GP

## 2017-05-26 PROCEDURE — 85027 COMPLETE CBC AUTOMATED: CPT | Performed by: PHYSICIAN ASSISTANT

## 2017-05-26 PROCEDURE — 36415 COLL VENOUS BLD VENIPUNCTURE: CPT | Performed by: PHYSICIAN ASSISTANT

## 2017-05-26 RX ORDER — CARBAMAZEPINE 300 MG/1
300 CAPSULE, EXTENDED RELEASE ORAL AT BEDTIME
Status: DISCONTINUED | OUTPATIENT
Start: 2017-05-26 | End: 2017-06-01

## 2017-05-26 RX ADMIN — CHOLECALCIFEROL CAP 125 MCG (5000 UNIT) 5000 UNITS: 125 CAP at 07:58

## 2017-05-26 RX ADMIN — NICOTINE POLACRILEX 4 MG: 2 GUM, CHEWING ORAL at 20:20

## 2017-05-26 RX ADMIN — NICOTINE POLACRILEX 4 MG: 2 GUM, CHEWING ORAL at 14:24

## 2017-05-26 RX ADMIN — DIAZEPAM 5 MG: 5 TABLET ORAL at 07:57

## 2017-05-26 RX ADMIN — DIPHENHYDRAMINE HYDROCHLORIDE 75 MG: 25 CAPSULE ORAL at 21:03

## 2017-05-26 RX ADMIN — NICOTINE POLACRILEX 4 MG: 2 GUM, CHEWING ORAL at 16:34

## 2017-05-26 RX ADMIN — SUMATRIPTAN 100 MG: 50 TABLET, FILM COATED ORAL at 10:52

## 2017-05-26 RX ADMIN — BUPRENORPHINE HCL 16 MG: 8 TABLET SUBLINGUAL at 08:32

## 2017-05-26 RX ADMIN — NICOTINE POLACRILEX 4 MG: 2 GUM, CHEWING ORAL at 10:53

## 2017-05-26 RX ADMIN — DIAZEPAM 5 MG: 5 TABLET ORAL at 16:34

## 2017-05-26 RX ADMIN — CARBAMAZEPINE 300 MG: 300 CAPSULE, EXTENDED RELEASE ORAL at 21:03

## 2017-05-26 RX ADMIN — NICOTINE POLACRILEX 4 MG: 2 GUM, CHEWING ORAL at 07:57

## 2017-05-26 RX ADMIN — DIPHENHYDRAMINE HYDROCHLORIDE 75 MG: 25 CAPSULE ORAL at 01:34

## 2017-05-26 ASSESSMENT — ACTIVITIES OF DAILY LIVING (ADL)
ORAL_HYGIENE: INDEPENDENT
HYGIENE/GROOMING: INDEPENDENT
DRESS: SCRUBS (BEHAVIORAL HEALTH)
LAUNDRY: WITH SUPERVISION
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
## 2017-05-26 NOTE — PLAN OF CARE
Brief medicine note:  - Right ankle and foot xrays negative. CMP reveals borderline elevated LFTs most likely 2/2 to his known h/o hep C. CBC and uric acid levels unremarkable. No further recommendation. Medicine will sign off. Please feel free to call with questions.       Maurizio Boston PA-C  Internal Medicine Hospitalist & Staff [facility]  [facility]  [phone]
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
## 2017-05-26 NOTE — PROGRESS NOTES
05/26/17 1436   Behavioral Health   Hallucinations denies / not responding to hallucinations   Thinking distractable   Orientation time: oriented;date: oriented;place: oriented;person: oriented   Memory baseline memory   Insight poor   Judgement impaired   Eye Contact at examiner   Affect tense   Mood anxious   Physical Appearance/Attire appears stated age   Hygiene well groomed   Suicidality other (see comments)  (Denies)   Self Injury other (see comment)  (Denies)   Activity restless   Speech coherent;clear   Psychomotor / Gait balanced;steady   Psycho Education   Type of Intervention 1:1 intervention   Response participates, initiates socially appropriate   Hours 0.5   Activities of Daily Living   Hygiene/Grooming independent   Oral Hygiene independent   Dress scrubs (behavioral health)   Laundry with supervision   Room Organization independent   Discharge - Next Level of Care   Continuing Care information sent Yes   Behavioral Health Interventions   Psychotic Symptoms maintain safety precautions;assist patient in developing safety plan;assist patient in following safety plan   Social and Therapeutic Interventions (Psychotic Symptoms) encourage socialization with peers;encourage participation in therapeutic groups and milieu activities   Pt attended groups this shift and he denies suicidal ideation. He reports being anxious and denies depression. He appears cooperative and pleasant on approach. He states that he is here for medication adjustment. He was observed pacing in the hunt and he was on phone and the writer heard him stating that you are not going to power of  over me(pt). He complaint about geodon, made him drugged up and pain on his leg.

## 2017-05-27 PROCEDURE — A9270 NON-COVERED ITEM OR SERVICE: HCPCS | Mod: GY | Performed by: PHYSICIAN ASSISTANT

## 2017-05-27 PROCEDURE — 25000132 ZZH RX MED GY IP 250 OP 250 PS 637: Mod: GY | Performed by: PSYCHIATRY & NEUROLOGY

## 2017-05-27 PROCEDURE — A9270 NON-COVERED ITEM OR SERVICE: HCPCS | Mod: GY | Performed by: PSYCHIATRY & NEUROLOGY

## 2017-05-27 PROCEDURE — 25000132 ZZH RX MED GY IP 250 OP 250 PS 637: Mod: GY | Performed by: PHYSICIAN ASSISTANT

## 2017-05-27 PROCEDURE — 90853 GROUP PSYCHOTHERAPY: CPT

## 2017-05-27 PROCEDURE — 12400001 ZZH R&B MH UMMC

## 2017-05-27 RX ADMIN — NICOTINE POLACRILEX 4 MG: 2 GUM, CHEWING ORAL at 16:35

## 2017-05-27 RX ADMIN — BUPRENORPHINE HCL 16 MG: 8 TABLET SUBLINGUAL at 08:31

## 2017-05-27 RX ADMIN — NICOTINE POLACRILEX 4 MG: 2 GUM, CHEWING ORAL at 12:43

## 2017-05-27 RX ADMIN — CARBAMAZEPINE 300 MG: 300 CAPSULE, EXTENDED RELEASE ORAL at 21:08

## 2017-05-27 RX ADMIN — NICOTINE POLACRILEX 4 MG: 2 GUM, CHEWING ORAL at 20:15

## 2017-05-27 RX ADMIN — NICOTINE POLACRILEX 4 MG: 2 GUM, CHEWING ORAL at 18:28

## 2017-05-27 RX ADMIN — DIAZEPAM 5 MG: 5 TABLET ORAL at 16:35

## 2017-05-27 RX ADMIN — DIAZEPAM 5 MG: 5 TABLET ORAL at 09:17

## 2017-05-27 RX ADMIN — HYDROXYZINE HYDROCHLORIDE 50 MG: 25 TABLET ORAL at 12:42

## 2017-05-27 RX ADMIN — NICOTINE POLACRILEX 4 MG: 2 GUM, CHEWING ORAL at 11:13

## 2017-05-27 RX ADMIN — HYDROXYZINE HYDROCHLORIDE 50 MG: 25 TABLET ORAL at 00:54

## 2017-05-27 RX ADMIN — DIPHENHYDRAMINE HYDROCHLORIDE 75 MG: 25 CAPSULE ORAL at 21:08

## 2017-05-27 RX ADMIN — SENNOSIDES AND DOCUSATE SODIUM 2 TABLET: 8.6; 5 TABLET ORAL at 08:31

## 2017-05-27 RX ADMIN — DIAZEPAM 5 MG: 5 TABLET ORAL at 00:54

## 2017-05-27 RX ADMIN — NICOTINE POLACRILEX 4 MG: 2 GUM, CHEWING ORAL at 22:09

## 2017-05-27 RX ADMIN — NICOTINE POLACRILEX 4 MG: 2 GUM, CHEWING ORAL at 06:33

## 2017-05-27 ASSESSMENT — ACTIVITIES OF DAILY LIVING (ADL)
DRESS: INDEPENDENT
LAUNDRY: WITH SUPERVISION
ORAL_HYGIENE: INDEPENDENT
GROOMING: INDEPENDENT
ORAL_HYGIENE: INDEPENDENT
DRESS: INDEPENDENT
GROOMING: INDEPENDENT

## 2017-05-27 NOTE — PROGRESS NOTES
[facility], [place]   Psychiatric Progress Note        Interim History:   The patient's care was discussed with the treatment team during the daily team meeting and/or staff's chart notes were reviewed.  Staff report patient went to groups, had difficulties participating due to lack of concentration and loose thinking. He was seen today first alone, then, during meeting with his girlfriend Kiah with presence of , RN, KENDRA and myself. Patient reported still not remembering his behavior causing this admission, but said that he believed people and it made him ashamed of himself. His girlfriend clarified that the patient's landlord got a restraining order against Mosque and Mosque will have to move out before the end of June. We discussed medications, Mosque stated that one dose of 20 mg Geodon had made him very sedated and he refused to take it during the day. I explained to both girlfriend and Mosque that he, likely, had a manic episode, however, exact diagnosis was not clear because of history of multiple TBIs, at least one confirmed seizure, poor compliance with medications. We agreed to do neuropsychological testing and neurology consult, discontinue Geodon and start patient on Carbamazepine.          Medications:       carBAMazepine  300 mg Oral At Bedtime     senna-docusate  2 tablet Oral Daily     buprenorphine (SUBUTEX) sublingual tablet 16 mg  16 mg Sublingual Daily     cholecalciferol (vitamin D3) capsule CAPS 5,000 Units  5,000 Units Oral Once per day on Mon Wed Fri          Allergies:     Allergies   Allergen Reactions     Droperidol Other (See Comments)     Spine turns to right and gets stiff, unable to move     Haloperidol Lactate Other (See Comments)     Spine turns to right becomes stiff and is unable to move     Nefazodone Difficulty breathing     Rofecoxib Difficulty breathing     Trazodone Difficulty breathing     Lamictal Starter Rash      "Naproxen Nausea and Vomiting     Gabapentin Other (See Comments)     Migraine and shaking          Labs:     Recent Results (from the past 24 hour(s))   Comprehensive metabolic panel    Collection Time: 05/26/17  9:55 AM   Result Value Ref Range    Sodium 142 133 - 144 mmol/L    Potassium 3.7 3.4 - 5.3 mmol/L    Chloride 105 94 - 109 mmol/L    Carbon Dioxide 29 20 - 32 mmol/L    Anion Gap 8 3 - 14 mmol/L    Glucose 123 (H) 70 - 99 mg/dL    Urea Nitrogen 5 (L) 7 - 30 mg/dL    Creatinine 0.60 (L) 0.66 - 1.25 mg/dL    GFR Estimate >90  Non  GFR Calc   >60 mL/min/1.7m2    GFR Estimate If Black >90   GFR Calc   >60 mL/min/1.7m2    Calcium 9.4 8.5 - 10.1 mg/dL    Bilirubin Total 1.1 0.2 - 1.3 mg/dL    Albumin 4.2 3.4 - 5.0 g/dL    Protein Total 8.1 6.8 - 8.8 g/dL    Alkaline Phosphatase 55 40 - 150 U/L    ALT 83 (H) 0 - 70 U/L    AST 90 (H) 0 - 45 U/L   CBC with platelets    Collection Time: 05/26/17  9:55 AM   Result Value Ref Range    WBC 3.6 (L) 4.0 - 11.0 10e9/L    RBC Count 4.47 4.4 - 5.9 10e12/L    Hemoglobin 14.8 13.3 - 17.7 g/dL    Hematocrit 42.2 40.0 - 53.0 %    MCV 94 78 - 100 fl    MCH 33.1 (H) 26.5 - 33.0 pg    MCHC 35.1 31.5 - 36.5 g/dL    RDW 12.9 10.0 - 15.0 %    Platelet Count 143 (L) 150 - 450 10e9/L   Uric acid    Collection Time: 05/26/17  9:55 AM   Result Value Ref Range    Uric Acid 3.4 (L) 3.5 - 7.2 mg/dL          Psychiatric Examination:   BP (!) 145/96  Pulse 73  Temp 97.1  F (36.2  C)  Resp 16  Ht 1.854 m (6' 1\")  Wt 70.7 kg (155 lb 12.8 oz)  SpO2 100%  BMI 20.56 kg/m2  Weight is 155 lbs 12.8 oz  Body mass index is 20.56 kg/(m^2).    Appearance: awake, alert  Attitude:  cooperative  Eye Contact:  good  Mood:  anxious and sad   Affect:  constricted mobility  Speech:  rambling  Psychomotor Behavior:  no evidence of tardive dyskinesia, dystonia, or tics  Throught Process:  tangental and circumstantial  Associations:  loosening of associations present  Thought " Content:  no evidence of suicidal ideation or homicidal ideation and no evidence of psychotic thought  Insight:  fair  Judgement:  fair  Oriented to:  time, person, and place  Attention Span and Concentration:  fair  Recent and Remote Memory:  fair         Precautions:     Behavioral Orders   Procedures     Code 1 - Restrict to Unit     Neuropsych Testing     Dr. Ryder, please see patient for neuropsychological testing. Patient has history of bipolar affective disorder, TBI, recent manic like episode with amnesia and violent behavior.     Routine Programming     As clinically indicated     Status 15     Every 15 minutes.          DIagnoses:   Posttraumatic stress disorder.  Likely bipolar affective disorder, most recent episode mixed.  History of diagnosis of cluster B personality disorder traits and polysubstance dependence.           Plan:     Will discontinue Geodon and start Carbamazepine. Will request neuropsychology consult and neurology input. CTC will help with finding for patient new psychiatrist.

## 2017-05-27 NOTE — PROGRESS NOTES
Patient talking to GF on phone--loud and shouting and was asked to quiet. When conversation complete patient remained angry but was able to calm somewhat.  Brief 1:1 in which patient was tangential, complaining, agitated--and about his GF and past girlfriends.  He initially wanted us to call his pharmacy to prevent GF from getting them--then changed his mind.  Several attempts to have patient be on topic--he was able to calm.  Bf called unit wanting to come get patient's keys--patient said no, and he called GF to let her know.

## 2017-05-27 NOTE — PROGRESS NOTES
Patients GF came into visit tonight. She was trying to coerce patient into giving her his keys to his apartment. Patient approached staff about the situation.  Patient stated he did not want his GF to take his keys because he has a safe and he was afraid of her stealing it. He also stated he did not want her in the apartment. Staff told GF she could not take his home keys.

## 2017-05-28 PROCEDURE — A9270 NON-COVERED ITEM OR SERVICE: HCPCS | Mod: GY | Performed by: PSYCHIATRY & NEUROLOGY

## 2017-05-28 PROCEDURE — 12400001 ZZH R&B MH UMMC

## 2017-05-28 PROCEDURE — 25000132 ZZH RX MED GY IP 250 OP 250 PS 637: Mod: GY | Performed by: PHYSICIAN ASSISTANT

## 2017-05-28 PROCEDURE — 25000132 ZZH RX MED GY IP 250 OP 250 PS 637: Mod: GY | Performed by: PSYCHIATRY & NEUROLOGY

## 2017-05-28 PROCEDURE — A9270 NON-COVERED ITEM OR SERVICE: HCPCS | Mod: GY | Performed by: PHYSICIAN ASSISTANT

## 2017-05-28 RX ADMIN — DIAZEPAM 5 MG: 5 TABLET ORAL at 03:02

## 2017-05-28 RX ADMIN — NICOTINE POLACRILEX 4 MG: 2 GUM, CHEWING ORAL at 20:03

## 2017-05-28 RX ADMIN — CARBAMAZEPINE 300 MG: 300 CAPSULE, EXTENDED RELEASE ORAL at 21:26

## 2017-05-28 RX ADMIN — NICOTINE POLACRILEX 4 MG: 2 GUM, CHEWING ORAL at 09:12

## 2017-05-28 RX ADMIN — DIAZEPAM 5 MG: 5 TABLET ORAL at 11:33

## 2017-05-28 RX ADMIN — HYDROXYZINE HYDROCHLORIDE 50 MG: 25 TABLET ORAL at 03:01

## 2017-05-28 RX ADMIN — NICOTINE POLACRILEX 4 MG: 2 GUM, CHEWING ORAL at 10:57

## 2017-05-28 RX ADMIN — NICOTINE POLACRILEX 4 MG: 2 GUM, CHEWING ORAL at 18:42

## 2017-05-28 RX ADMIN — DIAZEPAM 5 MG: 5 TABLET ORAL at 20:03

## 2017-05-28 RX ADMIN — NICOTINE POLACRILEX 4 MG: 2 GUM, CHEWING ORAL at 13:41

## 2017-05-28 RX ADMIN — NICOTINE POLACRILEX 4 MG: 2 GUM, CHEWING ORAL at 06:22

## 2017-05-28 RX ADMIN — BUPRENORPHINE HCL 16 MG: 8 TABLET SUBLINGUAL at 08:57

## 2017-05-28 RX ADMIN — DIPHENHYDRAMINE HYDROCHLORIDE 75 MG: 25 CAPSULE ORAL at 21:26

## 2017-05-28 RX ADMIN — NICOTINE POLACRILEX 4 MG: 2 GUM, CHEWING ORAL at 16:16

## 2017-05-28 RX ADMIN — NICOTINE POLACRILEX 4 MG: 2 GUM, CHEWING ORAL at 21:26

## 2017-05-28 ASSESSMENT — ACTIVITIES OF DAILY LIVING (ADL)
GROOMING: INDEPENDENT
ORAL_HYGIENE: INDEPENDENT
DRESS: INDEPENDENT

## 2017-05-28 NOTE — PROGRESS NOTES
05/27/17 8246   Behavioral Health   Hallucinations denies / not responding to hallucinations   Thinking poor concentration   Orientation time: oriented;date: oriented;place: oriented;person: oriented   Memory short term   Insight poor   Judgement impaired   Eye Contact at examiner   Affect blunted, flat;other (see comments)  (bright upon occassion)   Mood anxious   Physical Appearance/Attire neat;attire appropriate to age and situation;appears stated age   Hygiene well groomed   Suicidality other (see comments)  (None Stated)   Self Injury other (see comment)  (None Stated)   Activity other (see comment)  (groups, check in, social with others)   Speech clear;coherent   Medication Sensitivity no stated side effects;no observed side effects   Psychomotor / Gait steady;balanced     Pt.'s goal is to come back to reality. Pt.'s discharge plan is back home. Pt. Attended and participated in groups. Pt. Was social with others. Pt. Had a flat affect at times and was bright upon occasion. Pt. Appeared to have poor concentration and distracted. Pt. Stated anxious of 9/10. Pt. Took a shower. Pt. Appeared to have tangential speech. Pt.'s effective approaches was re-direction in conversation. Pt. Stated appetite could be better. Pt. Stated sleep as poor. Pt.'s pain was legs and feet. Pt. Made phone calls. Pt. Accepts PTSD. Pt. Is unsure of Bipolar.

## 2017-05-28 NOTE — PROGRESS NOTES
Attended Mental Health management group focused on identifying and expressing emotions as a healthy coping skill.  Participated in group Mindfulness Yoga sequence based on Mindfulness Based Stress Reduction (MBSR) education materials.  Occasionally made comments aloud during the activity, which was inappropriate socially.     He was also unable to identify feelings (either positive or negative) when asked to select from list provided to describe his experience during activity.    He arrived late to session but remained for full duration.

## 2017-05-28 NOTE — PLAN OF CARE
Problem: Psychotic Symptoms  Goal: Psychotic Symptoms  Signs and symptoms of listed problems will be absent or manageable.  1. Maintain safety precautions  2. Monitor need to revise level of observation  3. Maintain safe secure environment  4. Reality orientation  5. Simple, clear language  5. Decrease environmental stimulation  6. Assist in development of alternative methods of expressive communication  7. Encourage clear communication of needs  8. Redirection of intrusive behaviors  9. Redirection of aggressive behaviors  10. Assist patient in developing safety plan  11. Assist patient in following safety plan  12. Encourage nutrition and hydration  13. Encourage participation / independence with ADLs  14. Provide emotional support  15. Establish therapeutic relationship  16. Assist with developing & utilizing healthy coping strategies  17. Provide positive feedback for use of effective coping skills  18. Build upon strengths  19. Assess patient response to medication  20. Assess medication adherance  21. Monitor need for prn medication  22. Monitor confusion, memory loss, decision making ability and reorient / intervene as needed  23. Assess & implement appropriate substance withdrawal protocol  24. Monitor substance withdrawal process as necessary    .   Outcome: No Change  Pt presents as tense, Pt reports feeling anxious and worried. Pt was in common areas most of this shift and interacting appropriately w/ peers and staff, Pt attended the yoga group and enjoyed this. Pt had a cousin that came outside of visiting hours and had short meeting w/ this relative which he reports went well. When asked how he is doing Pt talks rapidly and shifts quickly between topics, his train of thought can be difficult to follow. Pt denies suicidal thoughts or troublesome thoughts but does talk about what he thinks his landlord, girlfriend and family are doing, ex; he stated he is worried his girlfriend will get his clonazepam Rx  from pharmacy for her own use. Pt stated he is ready to leave hospital but had difficulty stating how he will manage his Sx differently to prevent hospitalization. Pt reported severe anxiety and requested valium PRN, stated was effective.

## 2017-05-28 NOTE — PROGRESS NOTES
"Pt approached writer and stated he had a painful bump near anus. Writer informed him this sounds like hemorrhoids, that he should increase water and fiber and not strain w/ BM. Pt approached writer again and stated he had a \"black, tarry\" BM which he had already flushed. Writer advised Pt not to flush BM so staff can see and inform staff of any other concerns. Writer informed on call physician.   "

## 2017-05-28 NOTE — PROGRESS NOTES
"Patient's significant other called three times and she wanted to talk to the patient. Patient said \" I don't want to talk to her\". Patient reported to the writer \" she is not my girlfriend any more. She is my ex. She just wants me to sign MICHELLE for her so she can access my medical records and I don't wanna do that\". Patient is hyper-verbal and making several requests throughout the shift. He is socially appropriate and no intrusive or aggressive behaviors.   "

## 2017-05-29 PROCEDURE — A9270 NON-COVERED ITEM OR SERVICE: HCPCS | Mod: GY | Performed by: PHYSICIAN ASSISTANT

## 2017-05-29 PROCEDURE — A9270 NON-COVERED ITEM OR SERVICE: HCPCS | Mod: GY | Performed by: PSYCHIATRY & NEUROLOGY

## 2017-05-29 PROCEDURE — 25000132 ZZH RX MED GY IP 250 OP 250 PS 637: Mod: GY | Performed by: PSYCHIATRY & NEUROLOGY

## 2017-05-29 PROCEDURE — 25000132 ZZH RX MED GY IP 250 OP 250 PS 637: Mod: GY | Performed by: PHYSICIAN ASSISTANT

## 2017-05-29 PROCEDURE — 12400001 ZZH R&B MH UMMC

## 2017-05-29 RX ORDER — AMOXICILLIN 250 MG
1 CAPSULE ORAL DAILY
Status: DISCONTINUED | OUTPATIENT
Start: 2017-05-30 | End: 2017-06-02 | Stop reason: HOSPADM

## 2017-05-29 RX ADMIN — NICOTINE POLACRILEX 4 MG: 2 GUM, CHEWING ORAL at 11:39

## 2017-05-29 RX ADMIN — SENNOSIDES AND DOCUSATE SODIUM 1 TABLET: 8.6; 5 TABLET ORAL at 09:01

## 2017-05-29 RX ADMIN — SUMATRIPTAN 100 MG: 50 TABLET, FILM COATED ORAL at 03:41

## 2017-05-29 RX ADMIN — DIAZEPAM 5 MG: 5 TABLET ORAL at 15:47

## 2017-05-29 RX ADMIN — NICOTINE POLACRILEX 4 MG: 2 GUM, CHEWING ORAL at 13:28

## 2017-05-29 RX ADMIN — NICOTINE POLACRILEX 4 MG: 2 GUM, CHEWING ORAL at 20:09

## 2017-05-29 RX ADMIN — NICOTINE POLACRILEX 4 MG: 2 GUM, CHEWING ORAL at 21:13

## 2017-05-29 RX ADMIN — BUPRENORPHINE HCL 16 MG: 8 TABLET SUBLINGUAL at 09:01

## 2017-05-29 RX ADMIN — NICOTINE POLACRILEX 4 MG: 2 GUM, CHEWING ORAL at 17:33

## 2017-05-29 RX ADMIN — NICOTINE POLACRILEX 4 MG: 2 GUM, CHEWING ORAL at 22:17

## 2017-05-29 RX ADMIN — CHOLECALCIFEROL CAP 125 MCG (5000 UNIT) 5000 UNITS: 125 CAP at 09:01

## 2017-05-29 RX ADMIN — CARBAMAZEPINE 300 MG: 300 CAPSULE, EXTENDED RELEASE ORAL at 21:37

## 2017-05-29 RX ADMIN — DIAZEPAM 5 MG: 5 TABLET ORAL at 03:54

## 2017-05-29 RX ADMIN — HYDROXYZINE HYDROCHLORIDE 50 MG: 25 TABLET ORAL at 01:41

## 2017-05-29 RX ADMIN — NICOTINE POLACRILEX 4 MG: 2 GUM, CHEWING ORAL at 09:29

## 2017-05-29 RX ADMIN — NICOTINE POLACRILEX 4 MG: 2 GUM, CHEWING ORAL at 06:55

## 2017-05-29 RX ADMIN — DIPHENHYDRAMINE HYDROCHLORIDE 75 MG: 25 CAPSULE ORAL at 21:37

## 2017-05-29 ASSESSMENT — ACTIVITIES OF DAILY LIVING (ADL)
GROOMING: HANDWASHING
ORAL_HYGIENE: INDEPENDENT
ORAL_HYGIENE: INDEPENDENT
DRESS: STREET CLOTHES;INDEPENDENT
LAUNDRY: WITH SUPERVISION
HYGIENE/GROOMING: INDEPENDENT
DRESS: STREET CLOTHES;INDEPENDENT
LAUNDRY: WITH SUPERVISION

## 2017-05-29 NOTE — PROGRESS NOTES
Pt signed a 12 hour intent, on- call notified and 2 minutes later pt rescinded his 12 hour intent.

## 2017-05-29 NOTE — PROGRESS NOTES
"This author met with patient and his girlfriend per their request at about 1345.  Girlfriend had questions regarding patient's medications and signing a 12 hour intent to leave.  Girlfriend indicated patient did not have anywhere he could go as was being evicted from his apartment and is barred from entering her apartment building.  At this time patient began to scream at girlfriend \"you are lying!!\"  Was extremely angry and shouting at her.  Author removed girlfriend from the unit, at that time she indicated she felt unsafe if patient were to be discharged and expressed concern for his safety out of the hospital as well.  Psychiatrist was informed of 12 hour intent to leave and of the anger outburst directed toward girlfriend along with her concerns.  At 1540 this author asked patient if he wanted to leave the hospital.  Patient indicated did not want to leave and rescinded 12 hour intent.  Evening nurse will relay this to psychiatrist.  "

## 2017-05-29 NOTE — PROGRESS NOTES
"Brendan was very restless, getting up often, hyperverbal, and loud. When checking in with him, he didn't answer many questions directly, and was tangential, never getting back to the question at hand, often talking about something completely unrelated (he talked in length, without stopping, about all of his trauma and accidents). Brendan is convinced that the buprenorphine he is getting here at the hospital is not working as well as the buprenorphine he was getting outside of the hospital; because of this, he says he feels agitated and anxious, and he \"doesn't like it one bit.\" He appeared agitated in the lounge, and had to be redirected several times for his behavior. Brendan was constantly moving in his room, playing with papers, sitting up and down, and walking to the bathroom multiple times to spit out the mouthwash he was using (hosptal supplied).    Brendan has very little insight, denying any troubles and blaming others for his behaviors, as well as his stay here in the hospital. He eventually denied SI/SIB/HI and AVH, although he was saying that he was seeing things when closing his eyes.    He visited with his girlfriend, whom was demanding to see staff. Their visit was hot and cold, with Brendan leaving the room and walking the halls appearing very agitated and irritable.     05/29/17 1322   Behavioral Health   Hallucinations other (see comment)  (\"only when closing my eyes\")   Thinking poor concentration;distractable   Orientation person: oriented;place: oriented;date: oriented;time: oriented   Memory short term   Insight denial of illness;poor   Judgement impaired   Eye Contact at examiner  (eyes moving all over, little eye contact)   Affect angry;tense;irritable   Mood anxious;irritable;hopeless   Physical Appearance/Attire attire appropriate to age and situation;appears stated age;neat   Hygiene other (see comment)  (adequate)   Suicidality other (see comments)  (none stated, but pt avoided " answering)   Self Injury other (see comment)  (pt denied, none observed)   Activity hyperactive (agitated, impulsive);restless;other (see comment)  (in milieu, loud, irritable, somewhat social)   Speech pressured;tangential;clear;coherent;rambling   Medication Sensitivity akathisia   Psychomotor / Gait hyperactive;balanced;steady  (cosntant movement)   Activities of Daily Living   Hygiene/Grooming handwashing   Oral Hygiene independent   Dress street clothes;independent   Laundry with supervision   Room Organization independent   Behavioral Health Interventions   Psychotic Symptoms maintain safe secure environment;reality orientation;simple, clear language;decrease environmental stimulation;redirection of intrusive behaviors;establish therapeutic relationship;assist with developing & utilizing healthy coping strategies   Social and Therapeutic Interventions (Psychotic Symptoms) encourage effective boundaries with peers

## 2017-05-29 NOTE — PROGRESS NOTES
"Pt signed a 12 hour intent, then a few minutes later came up to the desk and was yelling, \"I already signed one of those at 1:32 am last evening\". Writer informed Pt that per chart records he had rescinded 12 hour intent, which means it goes away until another one is signed. Pt mumbled something incoherent then stated, \"what are you all trying to do to me\". Writer stated we have the 12 hour intent, which is valid unless rescinded. Pt stormed off.   "

## 2017-05-30 PROCEDURE — A9270 NON-COVERED ITEM OR SERVICE: HCPCS | Mod: GY | Performed by: PSYCHIATRY & NEUROLOGY

## 2017-05-30 PROCEDURE — 12400001 ZZH R&B MH UMMC

## 2017-05-30 PROCEDURE — 25000132 ZZH RX MED GY IP 250 OP 250 PS 637: Mod: GY | Performed by: PSYCHIATRY & NEUROLOGY

## 2017-05-30 PROCEDURE — A9270 NON-COVERED ITEM OR SERVICE: HCPCS | Mod: GY | Performed by: PHYSICIAN ASSISTANT

## 2017-05-30 PROCEDURE — 97150 GROUP THERAPEUTIC PROCEDURES: CPT | Mod: GO

## 2017-05-30 PROCEDURE — 99232 SBSQ HOSP IP/OBS MODERATE 35: CPT | Performed by: PSYCHIATRY & NEUROLOGY

## 2017-05-30 PROCEDURE — 25000132 ZZH RX MED GY IP 250 OP 250 PS 637: Mod: GY | Performed by: PHYSICIAN ASSISTANT

## 2017-05-30 PROCEDURE — 90853 GROUP PSYCHOTHERAPY: CPT

## 2017-05-30 RX ORDER — DIAZEPAM 5 MG
5 TABLET ORAL DAILY PRN
Status: DISCONTINUED | OUTPATIENT
Start: 2017-05-30 | End: 2017-06-02 | Stop reason: HOSPADM

## 2017-05-30 RX ORDER — DIAZEPAM 5 MG
5 TABLET ORAL 2 TIMES DAILY
Status: DISCONTINUED | OUTPATIENT
Start: 2017-05-30 | End: 2017-06-02 | Stop reason: HOSPADM

## 2017-05-30 RX ADMIN — ACETAMINOPHEN 325 MG: 325 TABLET, FILM COATED ORAL at 05:34

## 2017-05-30 RX ADMIN — NICOTINE POLACRILEX 4 MG: 2 GUM, CHEWING ORAL at 12:07

## 2017-05-30 RX ADMIN — DIAZEPAM 5 MG: 5 TABLET ORAL at 00:20

## 2017-05-30 RX ADMIN — ASPIRIN 650 MG: 325 TABLET, DELAYED RELEASE ORAL at 12:06

## 2017-05-30 RX ADMIN — DIAZEPAM 5 MG: 5 TABLET ORAL at 19:22

## 2017-05-30 RX ADMIN — HYDROXYZINE HYDROCHLORIDE 50 MG: 25 TABLET ORAL at 00:20

## 2017-05-30 RX ADMIN — NICOTINE POLACRILEX 4 MG: 2 GUM, CHEWING ORAL at 20:55

## 2017-05-30 RX ADMIN — BUPRENORPHINE HCL 16 MG: 8 TABLET SUBLINGUAL at 09:01

## 2017-05-30 RX ADMIN — NICOTINE POLACRILEX 4 MG: 2 GUM, CHEWING ORAL at 13:39

## 2017-05-30 RX ADMIN — NICOTINE POLACRILEX 4 MG: 2 GUM, CHEWING ORAL at 07:56

## 2017-05-30 RX ADMIN — DIAZEPAM 5 MG: 5 TABLET ORAL at 09:01

## 2017-05-30 RX ADMIN — NICOTINE POLACRILEX 2 MG: 2 GUM, CHEWING ORAL at 18:53

## 2017-05-30 RX ADMIN — NICOTINE POLACRILEX 4 MG: 2 GUM, CHEWING ORAL at 22:11

## 2017-05-30 RX ADMIN — NICOTINE POLACRILEX 4 MG: 2 GUM, CHEWING ORAL at 09:02

## 2017-05-30 RX ADMIN — SENNOSIDES AND DOCUSATE SODIUM 1 TABLET: 8.6; 5 TABLET ORAL at 09:01

## 2017-05-30 RX ADMIN — NICOTINE POLACRILEX 4 MG: 2 GUM, CHEWING ORAL at 06:42

## 2017-05-30 RX ADMIN — NICOTINE POLACRILEX 4 MG: 2 GUM, CHEWING ORAL at 17:33

## 2017-05-30 RX ADMIN — DIPHENHYDRAMINE HYDROCHLORIDE 75 MG: 25 CAPSULE ORAL at 21:06

## 2017-05-30 ASSESSMENT — ACTIVITIES OF DAILY LIVING (ADL)
LAUNDRY: WITH SUPERVISION
ORAL_HYGIENE: INDEPENDENT
DRESS: INDEPENDENT
GROOMING: INDEPENDENT

## 2017-05-30 NOTE — PROGRESS NOTES
"Pt appeared restless and tense, had to be redirected by writer a few times in regard to conversation topics.  Pt was fixated on something a staff \"said\" on the day shift, asking writer multiple times if the unit had cameras with a microphone so we could replay this supposed event.  Unable to get much of the behavioral questions answered due to his fixation on this topic.  No overt behavioral concerns other than topic choice on the evening shift.     05/29/17 4570   Behavioral Health   Thinking distractable;poor concentration   Orientation person: oriented;place: oriented;date: oriented;time: oriented   Memory short term;confabulation   Insight denial of illness;poor   Judgement impaired   Eye Contact at examiner   Affect tense   Mood anxious   Physical Appearance/Attire attire appropriate to age and situation   Hygiene well groomed   Suicidality other (see comments)  (SNEHA)   Self Injury other (see comment)  (SNEHA)   Activity hyperactive (agitated, impulsive);restless   Speech flight of ideas;pressured;rambling   Psychomotor / Gait balanced;steady   Coping/Psychosocial   Verbalized Emotional State frustration   Psycho Education   Type of Intervention 1:1 intervention   Response participates with cues/redirection   Group Therapy Session   Group Attendance attended group session   Group Type community   Activities of Daily Living   Hygiene/Grooming independent   Oral Hygiene independent   Dress street clothes;independent   Laundry with supervision   Room Organization independent   Behavioral Health Interventions   Psychotic Symptoms redirection of intrusive behaviors;assist with developing & utilizing healthy coping strategies;monitor need for prn medication   Social and Therapeutic Interventions (Psychotic Symptoms) encourage effective boundaries with peers     "

## 2017-05-30 NOTE — PLAN OF CARE
Problem: Goal Outcome Summary  Goal: Goal Outcome Summary  PT: will reschedule PT session as patient not appropriate for PT session at this time. Will reschedule.

## 2017-05-30 NOTE — PROGRESS NOTES
Initially seen by OT on this date. More observation needed to complete initial evaluation at this time.  Attended OT task group.  Was in and out of sessiions, appearing disorganized on task and unable to make deisions.  In the end chose to listen to music but again had a short attention span with that.

## 2017-05-30 NOTE — PROGRESS NOTES
"[facility], [place]   Psychiatric Progress Note        Interim History:   The patient's care was discussed with the treatment team during the daily team meeting and/or staff's chart notes were reviewed.  Staff report patient has been elevated and intrusive.     The patient reports that he is \"too high.\" Says that he was on Tegretol in the past and it made him \"agitated.\" Patient reports perceived allergies or extreme side effects to almost all mood stabilizers and antipsychotics discussed. Lamictal - \"rash\". Risperdal - \"dystonia.\" VPA - \"hair fell out.\" Does feel that he has seizures but unwilling to try Trileptal or other anticonvulsant mood stabilizers. Says that he has been on Latuda, Saphris and most other antipsychotics which were not helpful. Has done ECT which he feels was not helpful. Says that Valium during the day and Klonopin at night was \"exactly perfect.\" When informed that we would not be trying two benzodiazepines, patient opted for Valium but asked for it scheduled. Says that he \"finally slept\" last night. Wouldn't attribute it to Tegretol and did not want to increase it (and was reluctant to continue it). Says that he has a \"raging headache.\" Denies SI or HI. Says that we should got \"all the way in or all the way out\" with his medications but not willing to make many changes. Says that his GF is wrong and he will be able to return to his apartment.          Medications:       diazepam  5 mg Oral BID     senna-docusate  1 tablet Oral Daily     carBAMazepine  300 mg Oral At Bedtime     buprenorphine (SUBUTEX) sublingual tablet 16 mg  16 mg Sublingual Daily     cholecalciferol (vitamin D3) capsule CAPS 5,000 Units  5,000 Units Oral Once per day on Mon Wed Fri          Allergies:     Allergies   Allergen Reactions     Droperidol Other (See Comments)     Spine turns to right and gets stiff, unable to move     Haloperidol Lactate Other (See Comments)     Spine turns to right " "becomes stiff and is unable to move     Nefazodone Difficulty breathing     Rofecoxib Difficulty breathing     Trazodone Difficulty breathing     Lamictal Starter Rash     Naproxen Nausea and Vomiting     Gabapentin Other (See Comments)     Migraine and shaking          Labs:   No results found for this or any previous visit (from the past 24 hour(s)).       Psychiatric Examination:   BP (!) 145/96  Pulse 73  Temp 97.2  F (36.2  C)  Resp 16  Ht 1.854 m (6' 1\")  Wt 70.8 kg (156 lb 1.6 oz)  SpO2 100%  BMI 20.59 kg/m2  Weight is 156 lbs 1.6 oz  Body mass index is 20.59 kg/(m^2).    Appearance: awake, alert  Attitude:  slightly uncooperative  Eye Contact:  intense  Mood:  \"too high\"  Affect:  intensity is heightened and intensity is dramatic  Speech:  rambling  Psychomotor Behavior:  fidgeting  Thought Process:  illogical  Associations:  no loose associations  Thought Content:  no evidence of suicidal ideation or homicidal ideation  Insight:  limited  Judgement:  limited  Oriented to:  time, person, and place  Attention Span and Concentration:  fair  Recent and Remote Memory:  poor         Precautions:     Behavioral Orders   Procedures     Code 1 - Restrict to Unit     Neuropsych Testing     Dr. Ryder, please see patient for neuropsychological testing. Patient has history of bipolar affective disorder, TBI, recent manic like episode with amnesia and violent behavior.     Routine Programming     As clinically indicated     Status 15     Every 15 minutes.          DIagnoses:   Posttraumatic stress disorder.    Likely bipolar affective disorder, most recent episode mixed.    History of diagnosis of cluster B personality disorder traits and polysubstance dependence.           Plan:     1) Continue Tegretol. Patient unwilling to increase at this time.   2) Discussed multiple medication options with patient who feels that combination of Valium and Klonopin is the only thing that was helpful for him.   3) Will " schedule Valium 5mg BID and have one dose available PRN.   4) Neurology consulted.   5) Neuropsychology testing ordered.   6) Disposition to be determined. Patient to show improvement in terms of fernando prior to discharge.

## 2017-05-30 NOTE — PROGRESS NOTES
"Pt has been somewhat less negative today. Pt has attended most groups and was not monopolizing conversations. Pt seemed to appreciate staff's help and was a bit more patient. Pt's girlfriend however was rude on the phone to all staff, swearing at times about how patient was getting his rights violated. When this writer asked pt why he wants her to know info about him, he said \"she freaks out if she doesn't know what's going on with me\". When this writer asked him why she yells at staff so much he said, \"She's going through menopause\". Pt denies SI and was unable to answer other questions because he would not- or could not- stay focused enough.     05/30/17 1517   Behavioral Health   Hallucinations denies / not responding to hallucinations   Thinking confused;distractable;poor concentration   Orientation person: oriented;place: oriented   Memory short term;confabulation   Insight poor   Judgement impaired   Eye Contact at examiner   Affect tense;blunted, flat;irritable   Mood depressed;grandiose;anxious   Physical Appearance/Attire appears stated age;attire appropriate to age and situation   Hygiene (adequate)   Suicidality (denies)   Self Injury (denies)   Activity (WDL) WDL   Speech coherent;clear;rambling   Medication Sensitivity no stated side effects;no observed side effects   Psychomotor / Gait balanced;steady   Coping/Psychosocial   Verbalized Emotional State anxiety;depression;disbelief;frustration   Psycho Education   Type of Intervention 1:1 intervention   Response (would not answer questions regarding wellness strategies)   Hours 0.5   Treatment Detail (wellness strategies)   Behavioral Health Interventions   Psychotic Symptoms maintain safety precautions;monitor need to revise level of observation;maintain safe secure environment;redirection of intrusive behaviors;provide emotional support;establish therapeutic relationship;assist with developing & utilizing healthy coping strategies;assess patient " response to medication;assess medication adherance;monitor confusion, memory loss, decision making ability and reorient / intervent as needed   Social and Therapeutic Interventions (Psychotic Symptoms) encourage participation in therapeutic groups and milieu activities;encourage effective boundaries with peers        05/30/17 1517   Behavioral Health   Hallucinations denies / not responding to hallucinations   Thinking confused;distractable;poor concentration   Orientation person: oriented;place: oriented   Memory short term;confabulation   Insight poor   Judgement impaired   Eye Contact at examiner   Affect tense;blunted, flat;irritable   Mood depressed;grandiose;anxious   Physical Appearance/Attire appears stated age;attire appropriate to age and situation   Hygiene (adequate)   Suicidality (denies)   Self Injury (denies)   Activity (WDL) WDL   Speech coherent;clear;rambling   Medication Sensitivity no stated side effects;no observed side effects   Psychomotor / Gait balanced;steady   Coping/Psychosocial   Verbalized Emotional State anxiety;depression;disbelief;frustration   Psycho Education   Type of Intervention 1:1 intervention   Response (would not answer questions regarding wellness strategies)   Hours 0.5   Treatment Detail (wellness strategies)   Behavioral Health Interventions   Psychotic Symptoms maintain safety precautions;monitor need to revise level of observation;maintain safe secure environment;redirection of intrusive behaviors;provide emotional support;establish therapeutic relationship;assist with developing & utilizing healthy coping strategies;assess patient response to medication;assess medication adherance;monitor confusion, memory loss, decision making ability and reorient / intervent as needed   Social and Therapeutic Interventions (Psychotic Symptoms) encourage participation in therapeutic groups and milieu activities;encourage effective boundaries with peers

## 2017-05-31 PROCEDURE — 12400001 ZZH R&B MH UMMC

## 2017-05-31 PROCEDURE — A9270 NON-COVERED ITEM OR SERVICE: HCPCS | Mod: GY | Performed by: PSYCHIATRY & NEUROLOGY

## 2017-05-31 PROCEDURE — 97150 GROUP THERAPEUTIC PROCEDURES: CPT | Mod: GO

## 2017-05-31 PROCEDURE — 25000132 ZZH RX MED GY IP 250 OP 250 PS 637: Mod: GY | Performed by: PSYCHIATRY & NEUROLOGY

## 2017-05-31 PROCEDURE — 25000132 ZZH RX MED GY IP 250 OP 250 PS 637: Mod: GY | Performed by: PHYSICIAN ASSISTANT

## 2017-05-31 PROCEDURE — 90853 GROUP PSYCHOTHERAPY: CPT

## 2017-05-31 PROCEDURE — A9270 NON-COVERED ITEM OR SERVICE: HCPCS | Mod: GY | Performed by: PHYSICIAN ASSISTANT

## 2017-05-31 RX ORDER — NICOTINE 21 MG/24HR
1 PATCH, TRANSDERMAL 24 HOURS TRANSDERMAL DAILY
Status: DISCONTINUED | OUTPATIENT
Start: 2017-05-31 | End: 2017-06-02 | Stop reason: HOSPADM

## 2017-05-31 RX ADMIN — NICOTINE POLACRILEX 2 MG: 2 GUM, CHEWING ORAL at 11:42

## 2017-05-31 RX ADMIN — SENNOSIDES AND DOCUSATE SODIUM 1 TABLET: 8.6; 5 TABLET ORAL at 09:14

## 2017-05-31 RX ADMIN — HYDROXYZINE HYDROCHLORIDE 50 MG: 25 TABLET ORAL at 11:41

## 2017-05-31 RX ADMIN — DIAZEPAM 5 MG: 5 TABLET ORAL at 09:15

## 2017-05-31 RX ADMIN — HYDROXYZINE HYDROCHLORIDE 25 MG: 25 TABLET ORAL at 17:24

## 2017-05-31 RX ADMIN — HYDROXYZINE HYDROCHLORIDE 50 MG: 25 TABLET ORAL at 01:14

## 2017-05-31 RX ADMIN — DIPHENHYDRAMINE HYDROCHLORIDE 75 MG: 25 CAPSULE ORAL at 22:00

## 2017-05-31 RX ADMIN — BUPRENORPHINE HCL 16 MG: 8 TABLET SUBLINGUAL at 09:15

## 2017-05-31 RX ADMIN — NICOTINE POLACRILEX 4 MG: 2 GUM, CHEWING ORAL at 09:36

## 2017-05-31 RX ADMIN — NICOTINE POLACRILEX 2 MG: 2 GUM, CHEWING ORAL at 22:01

## 2017-05-31 RX ADMIN — NICOTINE POLACRILEX 2 MG: 2 GUM, CHEWING ORAL at 20:14

## 2017-05-31 RX ADMIN — NICOTINE POLACRILEX 2 MG: 2 GUM, CHEWING ORAL at 18:29

## 2017-05-31 RX ADMIN — DIAZEPAM 5 MG: 5 TABLET ORAL at 20:14

## 2017-05-31 RX ADMIN — NICOTINE POLACRILEX 2 MG: 2 GUM, CHEWING ORAL at 17:24

## 2017-05-31 RX ADMIN — NICOTINE POLACRILEX 2 MG: 2 GUM, CHEWING ORAL at 14:33

## 2017-05-31 RX ADMIN — NICOTINE POLACRILEX 4 MG: 2 GUM, CHEWING ORAL at 06:36

## 2017-05-31 RX ADMIN — CHOLECALCIFEROL CAP 125 MCG (5000 UNIT) 5000 UNITS: 125 CAP at 11:43

## 2017-05-31 RX ADMIN — ASPIRIN 650 MG: 325 TABLET, DELAYED RELEASE ORAL at 22:35

## 2017-05-31 RX ADMIN — NICOTINE 1 PATCH: 14 PATCH, EXTENDED RELEASE TRANSDERMAL at 14:33

## 2017-05-31 ASSESSMENT — ACTIVITIES OF DAILY LIVING (ADL)
GROOMING: INDEPENDENT
DRESS: STREET CLOTHES
ORAL_HYGIENE: INDEPENDENT

## 2017-05-31 NOTE — PROGRESS NOTES
Attended 2 of 3 OT groups offered. Initiated group and asked for specific supplies. Once he was provided the supplies he promptly put them away and said he would start his task tomorrow. Social with and helpful to peers in their task completion. Scattered in conversation. Had word finding difficulties when discussing supports, coping skills and change. Noted he has a hx of TBI, however, minimizes the effects it has in his decision making, planning and problem solving skills.
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
## 2017-05-31 NOTE — PROGRESS NOTES
Behavioral Health  Note    Behavioral Health  Spirituality Group Note    UNIT 10N    Name: Brendan Collins YOB: 1970   MRN: [id number] Age: 46 year old      Patient attended -led group, which included discussion of spirituality, coping with illness and building resilience.    Patient attended group for 1.0 hrs.    The patient actively participated in group discussion and patient demonstrated an appreciation of topic's application for their personal circumstances.     Brendan named experiences of peace in his life.  He did not identify ways he currently cultivates peace in his life but recalled stories of how others have practiced peace in their lives.    Jeramie Larson MDiv.  Chaplain Resident  Pager [phone]
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
## 2017-05-31 NOTE — PROGRESS NOTES
SPIRITUAL HEALTH SERVICES  SPIRITUAL ASSESSMENT Progress Note  [facility] ([facility]) 10N     REFERRAL SOURCE: Patient request for follow-up support from staff Ginette hatfield, who is patient's .    Rastafari requested to see staff Ginette hatfield, if possible.  Ginette will be available to meet with Rastafari tomorrow.  I left a note with staff at the desk stating this as Quna was sleeping in his room when I stopped by.    PLAN: Staff chaplain Peng will follow-up tomorrow.  [facility] remains available for the duration of Rastafari's hospitalization.     Jeramie Larson MDiv.  Chaplain Resident  Pager [phone]

## 2017-06-01 PROCEDURE — A9270 NON-COVERED ITEM OR SERVICE: HCPCS | Mod: GY | Performed by: PSYCHIATRY & NEUROLOGY

## 2017-06-01 PROCEDURE — 97150 GROUP THERAPEUTIC PROCEDURES: CPT | Mod: GO

## 2017-06-01 PROCEDURE — 25000132 ZZH RX MED GY IP 250 OP 250 PS 637: Mod: GY | Performed by: PSYCHIATRY & NEUROLOGY

## 2017-06-01 PROCEDURE — 25000132 ZZH RX MED GY IP 250 OP 250 PS 637: Mod: GY | Performed by: PHYSICIAN ASSISTANT

## 2017-06-01 PROCEDURE — 99232 SBSQ HOSP IP/OBS MODERATE 35: CPT | Performed by: PSYCHIATRY & NEUROLOGY

## 2017-06-01 PROCEDURE — A9270 NON-COVERED ITEM OR SERVICE: HCPCS | Mod: GY | Performed by: PHYSICIAN ASSISTANT

## 2017-06-01 PROCEDURE — 12400001 ZZH R&B MH UMMC

## 2017-06-01 RX ORDER — ASPIRIN 325 MG
650 TABLET, DELAYED RELEASE (ENTERIC COATED) ORAL 2 TIMES DAILY PRN
Qty: 60 TABLET | COMMUNITY
Start: 2017-06-01 | End: 2017-06-02

## 2017-06-01 RX ORDER — DIPHENHYDRAMINE HYDROCHLORIDE, ZINC ACETATE 2; .1 G/100G; G/100G
CREAM TOPICAL 3 TIMES DAILY PRN
COMMUNITY
Start: 2017-06-01

## 2017-06-01 RX ORDER — DIAZEPAM 5 MG
5 TABLET ORAL EVERY 8 HOURS PRN
Qty: 14 TABLET | Refills: 0 | Status: SHIPPED | OUTPATIENT
Start: 2017-06-01

## 2017-06-01 RX ORDER — HYDROXYZINE HYDROCHLORIDE 25 MG/1
25-50 TABLET, FILM COATED ORAL EVERY 4 HOURS PRN
Qty: 60 TABLET | Refills: 1 | Status: SHIPPED | OUTPATIENT
Start: 2017-06-01

## 2017-06-01 RX ADMIN — BUPRENORPHINE HCL 16 MG: 8 TABLET SUBLINGUAL at 08:02

## 2017-06-01 RX ADMIN — SENNOSIDES AND DOCUSATE SODIUM 1 TABLET: 8.6; 5 TABLET ORAL at 08:02

## 2017-06-01 RX ADMIN — NICOTINE POLACRILEX 2 MG: 2 GUM, CHEWING ORAL at 07:35

## 2017-06-01 RX ADMIN — HYDROXYZINE HYDROCHLORIDE 50 MG: 25 TABLET ORAL at 16:18

## 2017-06-01 RX ADMIN — ASPIRIN 650 MG: 325 TABLET, DELAYED RELEASE ORAL at 06:05

## 2017-06-01 RX ADMIN — DIAZEPAM 5 MG: 5 TABLET ORAL at 19:44

## 2017-06-01 RX ADMIN — NICOTINE POLACRILEX 2 MG: 2 GUM, CHEWING ORAL at 19:43

## 2017-06-01 RX ADMIN — NICOTINE POLACRILEX 2 MG: 2 GUM, CHEWING ORAL at 18:25

## 2017-06-01 RX ADMIN — DIAZEPAM 5 MG: 5 TABLET ORAL at 08:02

## 2017-06-01 RX ADMIN — HYDROXYZINE HYDROCHLORIDE 50 MG: 25 TABLET ORAL at 00:34

## 2017-06-01 RX ADMIN — NICOTINE POLACRILEX 2 MG: 2 GUM, CHEWING ORAL at 16:18

## 2017-06-01 RX ADMIN — HYDROXYZINE HYDROCHLORIDE 25 MG: 25 TABLET ORAL at 06:05

## 2017-06-01 RX ADMIN — HYDROXYZINE HYDROCHLORIDE 50 MG: 25 TABLET ORAL at 19:44

## 2017-06-01 RX ADMIN — DIPHENHYDRAMINE HYDROCHLORIDE 75 MG: 25 CAPSULE ORAL at 21:14

## 2017-06-01 RX ADMIN — NICOTINE 1 PATCH: 14 PATCH, EXTENDED RELEASE TRANSDERMAL at 08:02

## 2017-06-01 ASSESSMENT — ACTIVITIES OF DAILY LIVING (ADL)
ORAL_HYGIENE: INDEPENDENT
ORAL_HYGIENE: INDEPENDENT
LAUNDRY: WITH SUPERVISION
GROOMING: HANDWASHING;SHOWER;INDEPENDENT
DRESS: STREET CLOTHES;INDEPENDENT
DRESS: STREET CLOTHES;INDEPENDENT
HYGIENE/GROOMING: HANDWASHING;SHOWER;INDEPENDENT

## 2017-06-01 NOTE — PROGRESS NOTES
"Pt was served eviction papers on the unit this morning by wei [place] Benjy Oliver. Pt is in shock and disbelief. Writer spoke with Pt and attempted to talk about next steps, he was too upset to talk about what to do next. Writer asked if we could talk tomorrow, and would this be better for Pt, he agreed tomorrow would be better. Pt stated, \"I'm still processing everything, I cannot do anything right now\". Pt's discharge has been cancelled, and we will reassess tomorrow.   "

## 2017-06-01 NOTE — PROGRESS NOTES
Writer TAMMY for  at [facility] requesting a call back regarding Pt referral. ([phone])

## 2017-06-01 NOTE — PROGRESS NOTES
Writer faxed order for neuropsychological evaluation to Lara Daly [facility] intake per their request. Fax: [phone]

## 2017-06-01 NOTE — PROGRESS NOTES
Pt signed MICHELLE for [facility]/Lara Daly.     Writer called and made the following outpatient appointments, information located on Pt's AVS.   Psychiatry Follow-up: Monday, June 5th at 9:00 am   [facility]   Charleen Chen  [address] [place] [address]. [phone] Fax: [phone]  *[facility] was unable to switch your provider to Latosha Cooper as we discussed, so at this appointment you need to request a new psychiatric provider if you would still like to change providers.                                                    Therapy Follow up: Wednesday, June 14th at 9:00 am and Wednesday, June 21st at 9:00 am   [facility]  Emiliano Arauz  [address] [place], [place] [id number] [phone]     Initial Neuropsychological Evaluation: Wednesday, July 14th at 8:30 am   [facility]   Dr. Rush Islas   [address] [place], [place] [id number] [phone]   *Psychological clinic is located on the 2nd floor     Pt is set-up for discharge, Writer reviewed resources with Pt, and explained Pt would be cabbed to his apartment.

## 2017-06-01 NOTE — PROGRESS NOTES
"Medications:     Refused HS Carbamazapine. States he believes it has caused him to have \"tingly lips, confusion, enlarged lymph nodes, headaches, and agitation\". States he believes these symptoms were resolved with hydroxyzine.       Requests and received PRN's this evening of hydroxyzine 25 mg (anxiety), Benadryl 75 mg (Sleep), and Aspirin 650 mg (headache). States they were effective.     "

## 2017-06-01 NOTE — PROGRESS NOTES
Dr Buchanan is to cancel pt's discharge-as pt was served papers by sheriff kevon millan, this am. He was quite anxious re not knowing what to do. Will give pt vistaril per request when it arrives from pharmacy; if pt continues to express anxiety.

## 2017-06-01 NOTE — PROGRESS NOTES
"Pt has been in the milieu.  He has been social with peers and attended community meeting.  Pt reports that his day has been \"up and down\".  He denies any feelings of depression at this time.  He complained of \"having side effects from the medication\".  Pt reports that he has ringing in his ears and tingling on his lips.  Pt reported having some passive SI but \"it went away\".     05/31/17 4238   Behavioral Health   Hallucinations denies / not responding to hallucinations   Thinking distractable;poor concentration   Orientation person: oriented;place: oriented;date: oriented   Insight (limited)   Judgement (limited)   Eye Contact at examiner   Affect full range affect   Mood mood is calm   Physical Appearance/Attire attire appropriate to age and situation   Hygiene (adequate)   Suicidality (denies SI)   Self Injury (denies SIB thinking)   Activity (in the milieu/social with peers/attended group)   Speech pressured;rambling;clear   Psychomotor / Gait balanced;steady   Activities of Daily Living   Hygiene/Grooming independent   Oral Hygiene independent   Dress street clothes   Room Organization independent     "

## 2017-06-02 VITALS
TEMPERATURE: 97.4 F | WEIGHT: 149.9 LBS | HEIGHT: 73 IN | DIASTOLIC BLOOD PRESSURE: 96 MMHG | OXYGEN SATURATION: 100 % | SYSTOLIC BLOOD PRESSURE: 145 MMHG | HEART RATE: 73 BPM | BODY MASS INDEX: 19.87 KG/M2 | RESPIRATION RATE: 16 BRPM

## 2017-06-02 PROCEDURE — A9270 NON-COVERED ITEM OR SERVICE: HCPCS | Mod: GY | Performed by: PSYCHIATRY & NEUROLOGY

## 2017-06-02 PROCEDURE — 25000132 ZZH RX MED GY IP 250 OP 250 PS 637: Mod: GY | Performed by: PSYCHIATRY & NEUROLOGY

## 2017-06-02 PROCEDURE — 97150 GROUP THERAPEUTIC PROCEDURES: CPT | Mod: GO

## 2017-06-02 PROCEDURE — 25000132 ZZH RX MED GY IP 250 OP 250 PS 637: Mod: GY | Performed by: PHYSICIAN ASSISTANT

## 2017-06-02 PROCEDURE — 99238 HOSP IP/OBS DSCHRG MGMT 30/<: CPT | Performed by: PSYCHIATRY & NEUROLOGY

## 2017-06-02 PROCEDURE — A9270 NON-COVERED ITEM OR SERVICE: HCPCS | Mod: GY | Performed by: PHYSICIAN ASSISTANT

## 2017-06-02 PROCEDURE — 90853 GROUP PSYCHOTHERAPY: CPT

## 2017-06-02 RX ORDER — BUPRENORPHINE 8 MG/1
16 TABLET SUBLINGUAL DAILY
Qty: 60 EACH | Refills: 0 | COMMUNITY
Start: 2017-06-02

## 2017-06-02 RX ORDER — AMOXICILLIN 250 MG
2 CAPSULE ORAL DAILY
Qty: 60 TABLET | Refills: 1 | Status: SHIPPED | OUTPATIENT
Start: 2017-06-02

## 2017-06-02 RX ADMIN — SENNOSIDES AND DOCUSATE SODIUM 1 TABLET: 8.6; 5 TABLET ORAL at 08:20

## 2017-06-02 RX ADMIN — HYDROXYZINE HYDROCHLORIDE 50 MG: 25 TABLET ORAL at 04:16

## 2017-06-02 RX ADMIN — BUPRENORPHINE HCL 16 MG: 8 TABLET SUBLINGUAL at 08:20

## 2017-06-02 RX ADMIN — NICOTINE POLACRILEX 2 MG: 2 GUM, CHEWING ORAL at 08:41

## 2017-06-02 RX ADMIN — NICOTINE 1 PATCH: 14 PATCH, EXTENDED RELEASE TRANSDERMAL at 08:20

## 2017-06-02 RX ADMIN — CHOLECALCIFEROL CAP 125 MCG (5000 UNIT) 5000 UNITS: 125 CAP at 08:20

## 2017-06-02 RX ADMIN — NICOTINE POLACRILEX 2 MG: 2 GUM, CHEWING ORAL at 13:38

## 2017-06-02 RX ADMIN — ASPIRIN 650 MG: 325 TABLET, DELAYED RELEASE ORAL at 00:10

## 2017-06-02 RX ADMIN — HYDROXYZINE HYDROCHLORIDE 25 MG: 25 TABLET ORAL at 08:20

## 2017-06-02 RX ADMIN — HYDROXYZINE HYDROCHLORIDE 50 MG: 25 TABLET ORAL at 00:09

## 2017-06-02 RX ADMIN — DIAZEPAM 5 MG: 5 TABLET ORAL at 08:20

## 2017-06-02 RX ADMIN — HYDROXYZINE HYDROCHLORIDE 25 MG: 25 TABLET ORAL at 10:17

## 2017-06-02 ASSESSMENT — ACTIVITIES OF DAILY LIVING (ADL)
ORAL_HYGIENE: INDEPENDENT
DRESS: INDEPENDENT
GROOMING: INDEPENDENT
LAUNDRY: WITH SUPERVISION

## 2017-06-02 NOTE — PROGRESS NOTES
Writer met with Pt to discuss discharge today. Pt is requesting to leave today. Reviewed crisis mental health resources in the community with Pt who wrote information down in his notebook. Discussed appointments with Pt and informed him, his nurse would review them again at discharge with him. Pt was receptive to resources provided and appointments made. Pt showed Writer eviction paperwork, which states Pt needs to vacate [address], which is Pt's storage space per his report. Pt's apartment address is [address]

## 2017-06-02 NOTE — DISCHARGE INSTRUCTIONS
Behavioral Discharge Planning and Instructions    Summary:  You presented to the Emergency room with memory loss and exhibiting dangerous behaviors. You were evaluated by the emergency room and subsequently admitted to Station 10 for monitoring, assessment and stabilization.  While admitted your symptoms improved. You are now denying thoughts of harming yourself or others and are being discharged to follow up with outpatient providers.    Main Diagnosis:   1.  Diagnosis of bipolar.  Mood stabilizers did not work.      2.  Chemical dependency; polymorphic.    3.  Cluster B personality.    4.  PTSD.    5.  Traumatic brain injury with multiple concussions.    Major Treatments, Procedures and Findings: You had the opportunity to participate in a therapeutic milieu, you were also given the opportunity to participate in groups to increase positive coping skills, you were evaluated and your mental health was assessed and managed by a psychiatrist, your medications were evaluated and adjusted as needed. Follow up appointments were made or referrals were given for you to make appointments. Various resources regarding your mental health symptoms were provided.    Symptoms to Report: Feeling more aggressive, increased confusion, losing more sleep, mood getting worse or thoughts of suicide    Lifestyle Adjustment: Adjust your lifestyle to get enough sleep, relaxation, exercise and  good nutrition. Continue to develop healthy coping skills to decrease stress and promote a healthy living environment. No use of alcohol, illegal drugs or addictive medications other than what is currently prescribed. Attend all your appointments and take your medications as prescribed.    Psychiatry Follow-up: Monday, June 5th at 9:00 am   [facility]   April Wilson  [address] [place] [id number]. [phone] Fax: [phone]  *[facility] was unable to switch your provider to Latosha Cooper as we discussed, so at this appointment you need  to request a new psychiatric provider if you would still like to change providers.                                                    Therapy Follow up: Wednesday, June 14th at 9:00 am and Wednesday, June 21st at 9:00 am   [facility]  Emiliano Fajardojenifer  [address] [place], [place] [id number] [phone]     Initial Neuropsychological Evaluation: Wednesday, July 14th at 8:30 am   [facility]   Dr. Rush Islas   [address] [place], [place] [id number] [phone]   *Psychological clinic is located on the 2nd floor     Resources:   Crisis Intervention: 164.816.9920 or 372-763-4223 (TTY: [phone]).  Call anytime for help.  National [place] on Mental Illness (www.[place].oseas.org): 564.334.4616 or 792-343-4871.  Alcoholics Anonymous (www.alcoholics-anonymous.org): Check your phone book for your local chapter.  Suicide Awareness Voices of Education (SAVE) (www.save.org): 258-360-KFZA (9683)  National Suicide Prevention Line (www.mentalhealthmn.org): 627-889-MTKH (0733)  Mental Health Consumer/Survivor Network of [place] (www.mhcsn.net): 756.925.1049 or 847-713-0086  Mental Health Association of [place] (www.mentalhealth.org): 611.401.7396 or 607-011-4143  **[facility] MENTAL HEALTH CRISIS COPE TEAM: [phone]**    General Medication Instructions:   See your medication sheet(s) for instructions.   Take all medicines as directed.  Make no changes unless your doctor suggests them.   Go to all your doctor visits.  Be sure to have all your required lab tests. This way, your medicines can be refilled on time.  Do not use any drugs not prescribed by your doctor.  Avoid alcohol.    The treatment team has appreciated the opportunity to work with you.  We wish you the best in the future. If you have any questions or concerns our unit number is [phone].
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
## 2017-06-02 NOTE — PLAN OF CARE
"\"I'm feeling better, I'm more optimistic now.\"  Initially states felt \"wonderful\" when was admitted, then admitted was very confused on admission.  Indicates is less confused now, concentration and focus have greatly improved.  Denies feeling \"hyper or manic\" on admission, denies suicidal ideation.  Denies felt depressed, states Vistaril has been very beneficial in reducing anxiety.  States is sleeping well although slept poorly last evening.  Feels will sleep better at home.Speech remains hyper verbal and somewhat tangential.  Indicates will need to \"deal with the eviction after I get home.\"  Has been visible in milieu, social with peers and has attended some groups.  "

## 2017-06-02 NOTE — DISCHARGE SUMMARY
Psychiatric Discharge Summary    Brendan Collins MRN# [id number]   Age: 46 year old YOB: 1970     Date of Admission:  5/24/2017  Date of Discharge:  6/2/2017  3:35 PM  Admitting Physician:  Alex Barajas MD  Discharge Physician:  Herb Buchanan MD          Event Leading to Hospitalization:   HISTORY OF PRESENT ILLNESS:  The patient presents as only a partially reliable historian.  He in fact reports that he has been feeling confused and not being in reality, admits that he has been feeling pretty strange in the last 2-3 weeks, cleaning place where he lived (he first lived at his girlfriend's) and was asked to leave apparently because of his disorganized behavior and then moved to his own apartment.  He reports poor sleep, racing thoughts, dreams that he was a superman, elevated energy, poor compliance with medications.  The patient reports vague suicidal thoughts.  He said that he is technically still in a relationship, however, has not seen his girlfriend much and has not talked to her lately after he was asked to leave her apartment.  The patient voiced some desire to get of both diazepam and Klonopin he is prescribed, but today told me that he in fact noticed increased anxiety and would like to go back to diazepam.        See Admission note for additional details.          DIagnoses:     Posttraumatic stress disorder.    Likely bipolar affective disorder, most recent episode mixed.    History of diagnosis of cluster B personality disorder traits and polysubstance dependence.           Labs:          Lab Results   Component Value Date     05/26/2017    Lab Results   Component Value Date    CHLORIDE 105 05/26/2017    Lab Results   Component Value Date    BUN 5 05/26/2017      Lab Results   Component Value Date    POTASSIUM 3.7 05/26/2017    Lab Results   Component Value Date    CO2 29 05/26/2017    Lab Results   Component Value Date    CR 0.60 05/26/2017        Lab Results    Component Value Date    WBC 3.6 (L) 05/26/2017    HGB 14.8 05/26/2017    HCT 42.2 05/26/2017    MCV 94 05/26/2017     (L) 05/26/2017     Lab Results   Component Value Date    AST 90 (H) 05/26/2017    ALT 83 (H) 05/26/2017    GGT 25 05/25/2010    ALKPHOS 55 05/26/2017    BILITOTAL 1.1 05/26/2017    BILICONJ 0.0 08/16/2010     Lab Results   Component Value Date    TSH 1.65 12/18/2016            Consults:   Consultation during this admission received from internal medicine:    Assessment and Plan/Recommendations:   Brendan Collins is a 46 year old male with a history of chronic Hepatitis C, opiate abuse (on Subutex), bipolar 1 disorder, depression, anxiety, PTSD, migraines, and TBI who was admitted to inpatient behavioral health on 5/24/17 with suicidal ideation.     Suicidal Ideation, Bipolar 1, PTSD, Depression, Anxiety - Management per psychiatry.     Recent Benzodiazepine Withdrawal - In context of not taking his medication as prescribed over the past week. Though his account of recent events is concerning for misuse of chronic benzodiazepines. Given his past substance abuse issues, is likely a poor candidate for ongoing therapy, but will defer to psychiatry.  - Management per psychiatry.     Right Great Toe Inflammation - Unclear duration of symptoms or if preceding traumatic event/injury to the area. No history of gout. Exam significant for mild erythema along the lateral nail border consistent with ingrown toenail. Pain appears out of proportion to exam findings.  - Right Great Toe XR to rule out fracture in setting of unknown trauma history  - Check uric acid level with labs on 5/26 given erythema and significant joint pain, though suspicion for gout is relatively low  - Warm water soaks TID  - Consider PO antibiotics if progressing erythema or purulent drainage develop  - If symptoms persist, should follow up with Podiatry as an outpatient.     Right Ankle Pain and Swelling - Unclear duration  or if any preceding trauma/injury to the area, though patient notes he thinks he may have fallen in the past week. Suspect due to mild ankle sprain.  - Right Ankle and Foot XR to rule out acute bony abnormalities given patient believes he may have sustained trauma to the area PTA, though suspicion for fracture is low  - Tylenol PRN for pain.  - Elevate the extremity as much as possible  - Ice to area PRN for comfort and swelling relief  - PT consult to assess for home safety and need for assistive device given patient's concerns for possible recent falls at home     Chronic Hepatitis C - Genotype 3. Follows with [facility], last visit in 4/2017. Last RNA Quant 4.5 million in 12/2016. MR Abdomen Elastography w/o Contrast without evidence of fibrosis on 1/5/17. Most recent LFTs showed TBili of 0.7, Alk Phos 52, , and AST 69 in 12/2016.  - Per chart review, it appears [facility] team is planning to start treatment with Epclusa, but have been unable to reach patient. Will have him follow up after discharge w/their team to start therapy.  - Check CMP and CBC on 5/26       Hx of Opiate Abuse - On Subutex maintenance which is mannaged by Dr. Hightower at [facility].  - Management per psychiatry.     Generalized Pruritis - Unclear etiology. Appears to have several superficial scabs on extremities which may have been caused by excessive scratching/skin picking. No rashes. Present PTA, so doubt it is drug rash related to new medication this admission. ? If underlying psychiatric illness contributing.  - Check CMP to evaluate Bilirubin as if elevated, this could be causing pruritis (though no jaundice or scleral icterus, so doubt this will be the case)  - Atarax 25-50 mg q 4h prn for itching or anxiety  - Start Benadryl cream TID prn to affected areas     TBI - Follow up with TBI clinic after inpatient psychiatry discharge.     Migraines - Continue Imitrex at onset of symptoms per PTA dosing.            Hospital Course:   Brendan Collins was admitted to [facility] with attending Alex Evans MD and transferred to Herb Buchanan MD as a voluntary patient. The patient was placed under status 15 (15 minute checks) to ensure patient safety.   CBC, BMP and utox obtained.    All outpatient medications were continued. Tegretol was started but then discontinued as the patient refused to continue it due to perceived side effects. The patient was not willing to start any mood stabilizer or antipsychotic.     Brendan Collins did participate in groups and was visible in the milieu.     The patient's symptoms of depression and fernando improved.     Brendan Collins was released to home. At the time of discharge Brendan Collins was determined to not be a danger to himself or others. At the current time of discharge, the patient does not meet criteria for involuntary hospitalization. On the day of discharge, the patient reports that they do not have suicidal or homicidal ideation and would never hurt themselves or others. Steps taken to minimize risk include: assessing patient s behavior and thought process daily during hospital stay, discharging patient with adequate plan for follow up for mental and physical health and discussing safety plan of returning to the hospital should the patient ever have thoughts of harming themselves or others. Therefore, based on all available evidence including the factors cited above, the patient does not appear to be at imminent risk for self-harm, and is appropriate for outpatient level of care.            Discharge Medications:     Current Discharge Medication List      START taking these medications    Details   diphenhydrAMINE-zinc acetate (BENADRYL) cream Apply topically 3 times daily as needed for itching      hydrOXYzine (ATARAX) 25 MG tablet Take 1-2 tablets (25-50 mg) by mouth every 4 hours as needed for itching or anxiety  Qty: 60 tablet,  Refills: 1    Associated Diagnoses: PTSD (post-traumatic stress disorder)         CONTINUE these medications which have CHANGED    Details   aspirin  MG EC tablet Take 2 tablets (650 mg) by mouth 2 times daily as needed for moderate pain  Qty: 60 tablet, Refills: 1    Associated Diagnoses: Chronic pain of both knees      senna-docusate (SENOKOT-S;PERICOLACE) 8.6-50 MG per tablet Take 2 tablets by mouth daily  Qty: 60 tablet, Refills: 1    Associated Diagnoses: Slow transit constipation      diazepam (VALIUM) 5 MG tablet Take 1 tablet (5 mg) by mouth every 8 hours as needed for anxiety  Qty: 14 tablet, Refills: 0    Associated Diagnoses: PTSD (post-traumatic stress disorder)         CONTINUE these medications which have NOT CHANGED    Details   Buprenorphine HCl (SUBUTEX SL) Place 16 mg under the tongue daily 2 films       nicotine (NICOTROL) 10 MG/ML SOLN inhalation solution Spray 1 spray in nostril every hour as needed for smoking cessation  Qty: 1 Bottle, Refills: 1    Associated Diagnoses: Nicotine use disorder      Cholecalciferol (VITAMIN D3 PO) Take 5,000 Units by mouth three times a week       DiphenhydrAMINE HCl (BENADRYL PO) Take 25-75 mg by mouth nightly as needed       sucralfate (CARAFATE) 1 GM tablet Take 1 g by mouth 4 times daily as needed      SUMAtriptan Succinate (IMITREX PO) Take 100 mg by mouth daily as needed for migraine (May take 1 more tablet 2 hours after first dose if no relief)         STOP taking these medications       clonazePAM (KLONOPIN) 0.5 MG tablet Comments:   Reason for Stopping:                    Psychiatric Examination:   Appearance:  awake, alert and adequately groomed  Attitude:  cooperative  Eye Contact:  good  Mood:  better  Affect:  mood congruent  Speech:  clear, coherent  Psychomotor Behavior:  no evidence of tardive dyskinesia, dystonia, or tics  Thought Process:  more organized  Associations:  no loose associations  Thought Content:  no evidence of suicidal  ideation or homicidal ideation and no evidence of psychotic thought  Insight:  fair  Judgment:  fair  Oriented to:  time, person, and place  Attention Span and Concentration:  fair  Recent and Remote Memory:  fair  Language: Able to read and write  Fund of Knowledge: appropriate  Muscle Strength and Tone: normal  Gait and Station: Normal         Discharge Plan:   Continue Valium PRN. Recommend tapering in the future.     Major Treatments, Procedures and Findings: You had the opportunity to participate in a therapeutic milieu, you were also given the opportunity to participate in groups to increase positive coping skills, you were evaluated and your mental health was assessed and managed by a psychiatrist, your medications were evaluated and adjusted as needed. Follow up appointments were made or referrals were given for you to make appointments. Various resources regarding your mental health symptoms were provided.     Symptoms to Report: Feeling more aggressive, increased confusion, losing more sleep, mood getting worse or thoughts of suicide     Lifestyle Adjustment: Adjust your lifestyle to get enough sleep, relaxation, exercise and  good nutrition. Continue to develop healthy coping skills to decrease stress and promote a healthy living environment. No use of alcohol, illegal drugs or addictive medications other than what is currently prescribed. Attend all your appointments and take your medications as prescribed.     Psychiatry Follow-up: Monday, June 5th at 9:00 am   [facility]   Charleen Chen  [address] [place] [address]. [phone] Fax: [phone]  *[facility] was unable to switch your provider to Latosha Cooper as we discussed, so at this appointment you need to request a new psychiatric provider if you would still like to change providers.             Therapy Follow up: Wednesday, June 14th at 9:00 am and Wednesday, June 21st at 9:00 am   [facility]  Emiliano Arauz  [address] [place] [address] [phone]      Initial Neuropsychological Evaluation: Wednesday, July 14th at 8:30 am   [facility]   Dr. Rush Islas   [address] [place], [place] [id number] [phone]   *Psychological clinic is located on the 2nd floor      Resources:   Crisis Intervention: 517.429.2098 or 698-967-9497 (TTY: 705.800.6301).  Call anytime for help.  National [place] on Mental Illness (www.[place].oseas.org): 817.443.1505 or 345-477-1166.  Alcoholics Anonymous (www.alcoholics-anonymous.org): Check your phone book for your local chapter.  Suicide Awareness Voices of Education (SAVE) (www.save.org): 364-338-TTIQ (0383)  National Suicide Prevention Line (www.mentalhealthmn.org): 957-070-SXFD (7677)  Mental Health Consumer/Survivor Network of [place] (www.mhcsn.net): 542.307.3990 or 408-878-1275  Mental Health Association of [place] (www.mentalhealth.org): 893.194.9648 or 425-682-9480    Attestation:  The patient was seen and evaluated by me. I spent less than 30 minutes on discharge day activities. Herb Buchanan MD

## 2017-06-06 NOTE — PLAN OF CARE
Problem: Goal Outcome Summary  Goal: Goal Outcome Summary  Outcome: Completed Date Met:  06/06/17  PT: Pt discharged to home     Physical Therapy Discharge Summary     Reason for therapy discharge:    Discharged to home.     Progress towards therapy goal(s). See goals on Care Plan in [facility] electronic health record for goal details.  Goals partially met.  Barriers to achieving goals:   discharge from facility.     Therapy recommendation(s):    Continue home exercise program.

## 2018-05-14 ENCOUNTER — TRANSFERRED RECORDS (OUTPATIENT)
Dept: HEALTH INFORMATION MANAGEMENT | Facility: CLINIC | Age: 48
End: 2018-05-14

## 2018-07-30 ENCOUNTER — MEDICAL CORRESPONDENCE (OUTPATIENT)
Dept: HEALTH INFORMATION MANAGEMENT | Facility: CLINIC | Age: 48
End: 2018-07-30

## 2018-08-27 NOTE — TELEPHONE ENCOUNTER
FUTURE VISIT INFORMATION      FUTURE VISIT INFORMATION:    Date: 09/06/2018    Time: 9:00    Location: [facility]  REFERRAL INFORMATION:    Referring provider:  Dr. Emiliano Tian    Referring providers clinic:   [facility]     Reason for visit/diagnosis  Obstructive Sleep Apnea     RECORDS REQUESTED FROM:       Clinic name Comments Records Status Imaging Status   [facility] OFFICE VISIT: 07/30/2018 , 06/06/2018, 04/30/2018 ,01/30/2018, 09/13/2017,06/30/2017, 12/28/2016, 10/12/2016,08/24/2016, 06/23/2016, 12/22/2015, 09/17/2015,08/05/2015, 07/21/2015, 02/04/2015  EEG 08/30/2015  IMAGE: MRI  HEAD 09/28/2016  SLEEP STUDY: 08/23/2015 EXTERNAL YES   [facility] OFFICE VISIT: 03/14/2018 EXTERNAL NO                             RECORDS STATUS    PUT RECORDS IN HIM BAG TO GET SCANNED.

## 2018-09-06 ENCOUNTER — PRE VISIT (OUTPATIENT)
Dept: OTOLARYNGOLOGY | Facility: CLINIC | Age: 48
End: 2018-09-06

## 2018-09-06 ENCOUNTER — OFFICE VISIT (OUTPATIENT)
Dept: OTOLARYNGOLOGY | Facility: CLINIC | Age: 48
End: 2018-09-06
Payer: MEDICARE

## 2018-09-06 VITALS — BODY MASS INDEX: 25.03 KG/M2 | HEIGHT: 74 IN | WEIGHT: 195 LBS

## 2018-09-06 DIAGNOSIS — J34.2 DEVIATED NASAL SEPTUM: ICD-10-CM

## 2018-09-06 DIAGNOSIS — G47.33 OSA (OBSTRUCTIVE SLEEP APNEA): Primary | ICD-10-CM

## 2018-09-06 NOTE — PATIENT INSTRUCTIONS
1.  You were seen in the ENT Clinic today by Dr. Logan.  If you have any questions or concerns after your appointment, please call [phone].  Press option #1 for scheduling related needs.  Press option #3 for Nurse advice.  2.  Plan is to place a referral to a facial plastic surgeon for consideration of a septoplasty/possible rhinoplasty.  Dr. Max Layne or Dr. Kylee Viveros.      Dr. Max Layne  [phone]   [facility]    Dr. Kylee Viveros   [phone]   [facility] [place]

## 2018-09-06 NOTE — PROGRESS NOTES
SLEEP SURGERY CONSULTATION    Patient: Brendan Collins  : 1970  CHIEF COMPLAINT: EMMA    IDENTIFICATION: Dr. Tian consulted Dr. Logan for surgical evaluation and possible treatment of obstructive sleep apnea syndrome for Brendan Collins.    HPI:  Brendan Collins is a 47 year old year old male who has Obstructive Sleep Apnea.The patient's past medical history is significant for traumatic brain injury and closed head trauma and facial fractures in the past.  He has a history of mild obstructive sleep apnea with an overall AHI of 10 and a lowest oxygen of 86%.  Treatment has been somewhat difficult for Brendan because he has not really been able to use CPAP due to difficulties with breathing through his nose.  There is a high likelihood that he would have a mouth leak with a nasal mask and difficulty with tolerance to a full face mask.  He was evaluated by Dr. Christo Layne in  for his nose.  He has sustained a nasal fracture of the nose.  Brendan does have a difficult time breathing through the right side of his nose all the time.  Dr. Layne noticed a significant septal deviation and recommended a septoplasty with a rhinoplasty as well for external valve collapse.  Brendan did try to use Flonase.  He used it for approximately three months without any significant improvement.  Per Dr. Layne's note, he had recommended a functional septorhinoplasty with lateral nasal wall reconstruction and turbinate reduction.  Brendan reports that he did not go through with the surgery because he was not interested in having another surgery at that time.  In that timeframe, he had a lot of surgeries and was wanting to not have to go through another one.  In terms of his sleep apnea, he has not tried CPAP because of his nasal difficulties with his nose.  He did see a dentist for an oral appliance but did not think he would be able to tolerate it.  Dr. Tian asked me to evaluate to see if  there was any upper airway surgery that I could do to improve his sleep apnea.  Of note, patient also complains of difficulty with swallowing and regurgitation of food.  He did have a swallow study performed in 2014 which was normal.  He has not had any issues with his swallowing for the last year to year and a half,  but he is very careful with how he eats and what he eats.       PAST MEDICAL HISTORY:  Past Medical History:   Diagnosis Date     Anxiety      Bipolar 1 disorder (H)      Depressive disorder      Hep C w/o coma, chronic (H)      Migraine      Opiate abuse, episodic      PTSD (post-traumatic stress disorder)      TBI (traumatic brain injury) (H)        PAST SURGICAL HISTORY:  Past Surgical History:   Procedure Laterality Date     ARTHROPLASTY KNEE Right 11/9/2015    Procedure: ARTHROPLASTY KNEE;  Surgeon: Elijah Zuniga MD;  Location:  OR     ARTHROSCOPY KNEE  2/1/2012    Procedure:ARTHROSCOPY KNEE; Left Knee Arthroscopy, Partial Lateral Meniscectomy; Surgeon:ELIJAH ZUNIGA; Location: OR       MEDICATIONS:  Current Outpatient Prescriptions   Medication Sig Dispense Refill     aspirin  MG EC tablet Take 2 tablets (650 mg) by mouth 2 times daily as needed for moderate pain 60 tablet 1     buprenorphine (SUBUTEX) 8 MG SUBL sublingual tablet Place 2 tablets (16 mg) under the tongue daily 60 each 0     Cholecalciferol (VITAMIN D3 PO) Take 5,000 Units by mouth three times a week        diazepam (VALIUM) 5 MG tablet Take 1 tablet (5 mg) by mouth every 8 hours as needed for anxiety 14 tablet 0     DiphenhydrAMINE HCl (BENADRYL PO) Take 25-75 mg by mouth nightly as needed        diphenhydrAMINE-zinc acetate (BENADRYL) cream Apply topically 3 times daily as needed for itching       hydrOXYzine (ATARAX) 25 MG tablet Take 1-2 tablets (25-50 mg) by mouth every 4 hours as needed for itching or anxiety 60 tablet 1     nicotine (NICOTROL) 10 MG/ML SOLN inhalation solution Spray 1 spray in nostril  every hour as needed for smoking cessation 1 Bottle 1     senna-docusate (SENOKOT-S;PERICOLACE) 8.6-50 MG per tablet Take 2 tablets by mouth daily 60 tablet 1     sucralfate (CARAFATE) 1 GM tablet Take 1 g by mouth 4 times daily as needed       SUMAtriptan Succinate (IMITREX PO) Take 100 mg by mouth daily as needed for migraine (May take 1 more tablet 2 hours after first dose if no relief)         ALLERGIES:  Allergies   Allergen Reactions     Droperidol Other (See Comments)     Spine turns to right and gets stiff, unable to move     Haloperidol Lactate Other (See Comments)     Spine turns to right becomes stiff and is unable to move     Nefazodone Difficulty breathing     Rofecoxib Difficulty breathing     Trazodone Difficulty breathing     Lamictal Starter Rash     Naproxen Nausea and Vomiting     Gabapentin Other (See Comments)     Migraine and shaking       SOCIAL HISTORY:  Social History     Social History     Marital status: Single     Spouse name: N/A     Number of children: N/A     Years of education: N/A     Occupational History     Not on file.     Social History Main Topics     Smoking status: Current Some Day Smoker     Smokeless tobacco: Never Used     Alcohol use Yes      Comment: on weekends,Last dran was last night     Drug use: Yes     Special: Marijuana      Comment: Last used 2 days ago     Sexual activity: Yes     Other Topics Concern     Not on file     Social History Narrative       FAMILY HISTORY:  Family History   Problem Relation Age of Onset     Glaucoma No family hx of      Macular Degeneration No family hx of      Thyroid Disease No family hx of      Eye Surgery No family hx of      Retinal detachment No family hx of      Amblyopia No family hx of      Strabismus No family hx of        REVIEW OF SYSTEMS:  [facility] E[facility] ROS 9/6/2018   Constitutional Weight gain   Neurology Dizzy spells, Unexplained weakness, Headache   Psychology Frequently feeling anxious   Eyes Double vision   Ears, Nose,  "Throat Hearing loss, Ringing/noise in ears, Nasal congestion or drainage, Trouble swallowing   Gastrointestinal/Genitourinary Heartburn/indigestion   Musculoskeletal Sore or stiff joints, Swollen joints, Back pain, Neck pain, Swollen legs/feet   Endocrine Thirst         PHYSICAL EXAM  Ht 1.88 m (6' 2\")  Wt 88.5 kg (195 lb)  BMI 25.04 kg/m2    Constitutional: healthy, alert and no distress  ENT:   NOSE:  Examination of the external nose does show a slightly sunken in appearance on the right side.  Septum is significantly deviated to the right.  He does appear to have some narrowing of the right internal nasal valve area.   ORAL CAVITY:  Modified Mallampati II.  Tonsils are 1+.  The palate is in good position.     ASSESSMENT:  1.  Mild obstructive sleep apnea,      2. 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  Therefore, I do not think more traditional upper airway surgery procedures would be of help for him.  I discussed with him that if he wanted to try CPAP, we would likely need to correct his nasal septal deformity.  I also discussed with him that most options with sleep apnea have pros and cons to them, and there is not necessarily an easy way out.  In terms of his nose, given the deformities seen, I would recommend that if he does choose to have surgery that it would be done by a facial plastic surgeon so that he would have the best chance of gaining improvement in his nasal breathing.  He could see Dr. Layne at [place] or Dr. Clary Viveros here at the [facility].  The biggest issue is that the patient is not sure if he really wants to go through with another surgery.  I encouraged him to consider his options and think about how much his nasal breathing bothers him.  If it does bother him, I do think surgery would be worthwhile.  I did discuss with him " that nasal surgery may not necessarily improve sleep apnea.  I would say there would be a 50:50 chance that it would improve his sleep apnea to a significant degree.       I spent 45 minutes face-to-face with Brendan Collins during today's office visit, of which more than 50% was spent on counseling and coordination of care, which included discussion of pathophysiology of patient's obstructive sleep apnea, treatment options, risks and benefits of each option.

## 2018-09-06 NOTE — NURSING NOTE
"Chief Complaint   Patient presents with     Consult     EMMA     Height 1.88 m (6' 2\"), weight 88.5 kg (195 lb).    Les Max    "

## 2018-09-06 NOTE — MR AVS SNAPSHOT
After Visit Summary   2018    Brendan Collins    MRN: [id number]           Patient Information     Date Of Birth          1970        Visit Information        Provider Department      2018 9:00 AM Jayna Logan MD [facility]        Care Instructions    1.  You were seen in the ENT Clinic today by Dr. Logan.  If you have any questions or concerns after your appointment, please call [phone].  Press option #1 for scheduling related needs.  Press option #3 for Nurse advice.  2.  Plan is to place a referral to a facial plastic surgeon for consideration of a septoplasty/possible rhinoplasty    Dr. Max Layne  [phone] [facility]    Kylee Viveros   [phone] [facility]              Follow-ups after your visit        Who to contact     Please call your clinic at [phone] to:    Ask questions about your health    Make or cancel appointments    Discuss your medicines    Learn about your test results    Speak to your doctor            Additional Information About Your Visit        CT [employer] Information     [employer] is an electronic gateway that provides easy, online access to your medical records. With [employer], you can request a clinic appointment, read your test results, renew a prescription or communicate with your care team.     To sign up for [employer] visit the website at [email or web address]   You will be asked to enter the access code listed below, as well as some personal information. Please follow the directions to create your username and password.     Your access code is: [email or web address]  Expires: 2018  6:30 AM     Your access code will  in 90 days. If you need help or a new code, please contact your [facility] or call [phone] for assistance.        Care EveryWhere ID     This is your Care EveryWhere ID. This could be used by other organizations to access  "your [place] medical records  [id number]        Your Vitals Were     Height BMI (Body Mass Index)                1.88 m (6' 2\") 25.04 kg/m2           Blood Pressure from Last 3 Encounters:   05/26/17 (!) 145/96   03/17/17 107/74   12/21/16 112/72    Weight from Last 3 Encounters:   09/06/18 88.5 kg (195 lb)   06/01/17 68 kg (149 lb 14.4 oz)   03/17/17 72.8 kg (160 lb 9 oz)              Today, you had the following     No orders found for display       Primary Care Provider Office Phone # Fax #    Kiah Hightower [phone]       [facility] [address] [facility] [facility] [address]        Equal Access to Services     LISA MOROCHO : Hadii michaela cedeno Sosuad, waaxda luqadaha, qaybta kaalmada adeegyada, agus solis . So [facility] [phone].    ATENCIÓN: Si habla español, tiene a long disposición servicios gratuitos de asistencia lingüística. Daniel al [phone].    We comply with applicable federal civil rights laws and [place] laws. We do not discriminate on the basis of race, color, national origin, age, disability, sex, sexual orientation, or gender identity.            Thank you!     Thank you for choosing [facility]  for your care. Our goal is always to provide you with excellent care. Hearing back from our patients is one way we can continue to improve our services. Please take a few minutes to complete the written survey that you may receive in the mail after your visit with us. Thank you!             Your Updated Medication List - Protect others around you: Learn how to safely use, store and throw away your medicines at www.disposemymeds.org.          This list is accurate as of 9/6/18  9:41 AM.  Always use your most recent med list.                   Brand Name Dispense Instructions for use Diagnosis    aspirin 325 MG EC tablet     60 tablet    Take 2 tablets (650 mg) by mouth 2 times daily as needed for moderate pain    Chronic " pain of both knees       BENADRYL PO      Take 25-75 mg by mouth nightly as needed        buprenorphine 8 MG Subl sublingual tablet    SUBUTEX    60 each    Place 2 tablets (16 mg) under the tongue daily        diazepam 5 MG tablet    VALIUM    14 tablet    Take 1 tablet (5 mg) by mouth every 8 hours as needed for anxiety    PTSD (post-traumatic stress disorder)       diphenhydrAMINE-zinc acetate cream    BENADRYL     Apply topically 3 times daily as needed for itching        hydrOXYzine 25 MG tablet    ATARAX    60 tablet    Take 1-2 tablets (25-50 mg) by mouth every 4 hours as needed for itching or anxiety    PTSD (post-traumatic stress disorder)       IMITREX PO      Take 100 mg by mouth daily as needed for migraine (May take 1 more tablet 2 hours after first dose if no relief)        nicotine 10 MG/ML Soln inhalation solution    NICOTROL    1 Bottle    Spray 1 spray in nostril every hour as needed for smoking cessation    Nicotine use disorder       senna-docusate 8.6-50 MG per tablet    SENOKOT-S;PERICOLACE    60 tablet    Take 2 tablets by mouth daily    Slow transit constipation       sucralfate 1 GM tablet    CARAFATE     Take 1 g by mouth 4 times daily as needed        VITAMIN D3 PO      Take 5,000 Units by mouth three times a week

## 2019-03-01 ENCOUNTER — MEDICAL CORRESPONDENCE (OUTPATIENT)
Dept: HEALTH INFORMATION MANAGEMENT | Facility: CLINIC | Age: 49
End: 2019-03-01

## 2019-03-01 ENCOUNTER — TRANSFERRED RECORDS (OUTPATIENT)
Dept: HEALTH INFORMATION MANAGEMENT | Facility: CLINIC | Age: 49
End: 2019-03-01

## 2020-01-16 ENCOUNTER — TRANSFERRED RECORDS (OUTPATIENT)
Dept: HEALTH INFORMATION MANAGEMENT | Facility: CLINIC | Age: 50
End: 2020-01-16

## 2020-01-17 ENCOUNTER — TRANSFERRED RECORDS (OUTPATIENT)
Dept: HEALTH INFORMATION MANAGEMENT | Facility: CLINIC | Age: 50
End: 2020-01-17

## 2020-11-23 ENCOUNTER — TRANSFERRED RECORDS (OUTPATIENT)
Dept: HEALTH INFORMATION MANAGEMENT | Facility: CLINIC | Age: 50
End: 2020-11-23

## 2020-12-01 ENCOUNTER — TRANSFERRED RECORDS (OUTPATIENT)
Dept: HEALTH INFORMATION MANAGEMENT | Facility: CLINIC | Age: 50
End: 2020-12-01

## 2020-12-18 ENCOUNTER — TELEPHONE (OUTPATIENT)
Dept: ORTHOPEDICS | Facility: CLINIC | Age: 50
End: 2020-12-18

## 2020-12-18 NOTE — TELEPHONE ENCOUNTER
Kiah called for her significant other Brendan,    She mentioned that he sustained a significant injury in January 2020. He went to [facility] and received emergency surgery.  Since that surgery, he is still having significant amounts of pain and there is evidence that there is poor healing and broken screws.  MRI at [facility] few weeks ago in December 1 - Dr. Koroma.  Exogen bone stimulator should be arriving soon.    Call Kiah , if she cannot answer, please call Brendan.     - Connor GANDARA ATC

## 2020-12-18 NOTE — TELEPHONE ENCOUNTER
Called patient's S/O, Kiah back so that they are aware we are looking into the case trying to decide which of our providers would be appropriate to see this patient. Kiah would also like it noted that the patient is having issues with his foot, he feels that something is broken and has trouble walking due to this. They are aware that they might not get an answer until after the weekend regarding this matter.

## 2020-12-21 NOTE — TELEPHONE ENCOUNTER
RECORDS RECEIVED FROM: right knee tibial shaft fx with hx of right TKA DOS 1/16/2020 ORIF right tibial sprial periprosthetic fx delayed nonunion, pain and issues walking. Seen at [facility]   DATE RECEIVED: Dec 23, 2020   NOTES STATUS DETAILS   OFFICE NOTE from referring provider Care Everywhere    OFFICE NOTE from other specialist N/A    DISCHARGE SUMMARY from hospital N/A    DISCHARGE REPORT from the ER N/A    OPERATIVE REPORT Care Everywhere    MEDICATION LIST Internal    IMPLANT RECORD/STICKER Care Everywhere    LABS     CBC/DIFF N/A    CULTURES N/A    INJECTIONS DONE IN RADIOLOGY N/A    MRI N/A    CT SCAN In process    XRAYS (IMAGES & REPORTS) In process    TUMOR     PATHOLOGY  Slides & report N/A    12/22/20   1:24 PM   Images resolved in PACS  Reports sent to britt Dodd CMA    12/21/20   8:35 AM   Request sent to [facility]  Mariela Dodd CMA

## 2020-12-22 NOTE — PROGRESS NOTES
[facility]  Orthopaedic Surgery Consultation by Alli Harris M.D.    Brendan Collins MRN# [id number]   Age: 50 year old YOB: 1970     Requesting physician: No ref. provider found  Kiah Hightower     Background history:  DX:  1. Traumatic brain injury  2. Cognitive disorder  3. Nicotine dependence, resolved  4. Drug dependence  5. Hepatitis C  6. MND deficiency  7. Depressive disorder  8. Bipolar disorder  9. PTSD  10. Left total knee arthroplasty    TREATMENTS:  1/16/2020, ORIF right tibial shaft fracture, Dr. 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  Today's history was taken with the help of his girlfriend who is in clinic as well.  In January 2020 patient had a ground-level fall and fractured his right tibia and fibula.  In the postoperative phase he continued to experience persistent pain.  During follow-up it was noted that the screws used through the 3.5 Denison plate were all broken.  During the last visit with Dr. Koroma a CT scan showed ample callus formation but minimal bony bridging of the fracture elements.  A bone stimulator was prescribed.  Clinically there are no signs of any infection.  The time of surgery the wound has healed nicely.  Patient ambulates weightbearing as tolerated without any assistive devices or external immobilizers.  Pain is present continuously and gets worse during weightbearing.  He uses opioids to mitigate the pain.  The patient denies smoking or having being diagnosed with diabetes mellitus.      Social:   Occupation:   Living situation:  Independently and alone but has been staying with girlfriend since January 2020.  Hobbies / Sports:     Smoking: No  Alcohol: Yes  Illicit drug use: Cannabis         Physical Exam:     EXAMINATION pertinent findings:   PSYCH: Pleasant, healthy-appearing, alert, oriented x3, cooperative. Normal mood and affect.  VITAL SIGNS: There were no vitals taken for this visit.  Reviewed nursing intake notes.   There is no height or weight on file to calculate BMI.  RESP: non labored breathing   ABD: benign, soft, non-tender, no acute peritoneal findings  SKIN: grossly normal   LYMPHATIC: grossly normal, no adenopathy, no extremity edema  NEURO: grossly normal , no motor deficits  VASCULAR: satisfactory perfusion of all extremities   MUSCULOSKELETAL:   Alignment: Neutral alignment of the right lower extremity.  The left tibia has a slight valgus alignment.  Minimal external rotation deformity compared to contralateral side.  Gait: Antalgic over right lower extremity with shortened stance.    The incision over the right tibia is well-healed.  No signs of infection.  The proximal part of the plate is prominent underneath the skin on the medial facet of the tibia.  Hypertrophic callus formation palpable at the site of the fracture.    Right LE:   Thigh and leg compartments soft and compressible   +Quad/TA/GSC/FHL/EHL   SILT DP/SP/Neela/Saph/Tib nerve distributions   Palpable dorsalis pedis pulse            Data:   All laboratory data reviewed  All imaging studies reviewed by me personally.    XR tibia right 12/23/2020:  Status post ORIF distal tibial fracture.  Appearance of hypertrophic nonunion.  All screws in plate are broken.  CT right tibia 11/23/2020:  Status post ORIF distal tibial fracture.  Hypertrophic nonunion present.  No signs of bony bridging between fracture elements.  All screws in the plate are broken.         Assessment and Plan:   Assessment:  50-year-old male status post ORIF distal tibia fracture right with what  appears to be a hypertrophic nonunion.  The most likely culprit for this hypertrophic nonunion would be a lack of stability of fracture elements.  Clinically a low index of suspicion of infection.      Plan:  Discussed my findings with the patient and his girlfriend.  It would be my recommendation to consider adding stability to the fracture elements.  This could be done externally by means of immobilization with boot or cast however I think it may be best to add stability internally.  Although the most conventional and reliable way of doing this would be to take down the nonunion and repair it with a new plate and screws in this particular case it may be more optimal to use the multiple empty screw holes in the current plate.  This would minimize the surgical dissection and thus risk of infection, could be performed in a day surgery setting.  Postoperatively I would advise to add external immobilization by means of a walking boot and weight-bear as tolerated.  Patient and girlfriend understand the proposal as set forth and will think about pursuing this option.  In the meantime I will discuss my thoughts with Dr. Koroma as well.  All questions were answered to the best of my ability.    Thank you for your referral.      Alli Harris MD, PhD     Adult Reconstruction  [facility] Department of Orthopaedic Surgery  Pager [phone]      DATA for DOCUMENTATION:         Past Medical History:     Patient Active Problem List   Diagnosis     Knee pain     Nasal obstruction     Depression     Total knee replacement status     Mood disorder (H)     Bipolar affective disorder (H)     Past Medical History:   Diagnosis Date     Anxiety      Bipolar 1 disorder (H)      Depressive disorder      Hep C w/o coma, chronic (H)      Migraine      Opiate abuse, episodic      PTSD (post-traumatic stress disorder)      TBI (traumatic brain injury) (H)        Also see scanned health assessment  forms.       Past Surgical History:     Past Surgical History:   Procedure Laterality Date     ARTHROPLASTY KNEE Right 11/9/2015    Procedure: ARTHROPLASTY KNEE;  Surgeon: Elijah Zuniga MD;  Location:  OR     ARTHROSCOPY KNEE  2/1/2012    Procedure:ARTHROSCOPY KNEE; Left Knee Arthroscopy, Partial Lateral Meniscectomy; Surgeon:ELIJAH ZUNIGA; Location: OR            Social History:     Social History     Socioeconomic History     Marital status: Single     Spouse name: Not on file     Number of children: Not on file     Years of education: Not on file     Highest education level: Not on file   Occupational History     Not on file   Social Needs     Financial resource strain: Not on file     Food insecurity     Worry: Not on file     Inability: Not on file     Transportation needs     Medical: Not on file     Non-medical: Not on file   Tobacco Use     Smoking status: Current Some Day Smoker     Smokeless tobacco: Never Used   Substance and Sexual Activity     Alcohol use: Yes     Comment: on weekends,Last dran was last night     Drug use: Yes     Types: Marijuana     Comment: Last used 2 days ago     Sexual activity: Yes   Lifestyle     Physical activity     Days per week: Not on file     Minutes per session: Not on file     Stress: Not on file   Relationships     Social connections     Talks on phone: Not on file     Gets together: Not on file     Attends Baptism service: Not on file     Active member of club or organization: Not on file     Attends meetings of clubs or organizations: Not on file     Relationship status: Not on file     Intimate partner violence     Fear of current or ex partner: Not on file     Emotionally abused: Not on file     Physically abused: Not on file     Forced sexual activity: Not on file   Other Topics Concern     Parent/sibling w/ CABG, MI or angioplasty before 65F 55M? Not Asked   Social History Narrative     Not on file            Family History:       Family History    Problem Relation Age of Onset     Glaucoma No family hx of      Macular Degeneration No family hx of      Thyroid Disease No family hx of      Eye Surgery No family hx of      Retinal detachment No family hx of      Amblyopia No family hx of      Strabismus No family hx of             Medications:     Current Outpatient Medications   Medication Sig     aspirin  MG EC tablet Take 2 tablets (650 mg) by mouth 2 times daily as needed for moderate pain     buprenorphine (SUBUTEX) 8 MG SUBL sublingual tablet Place 2 tablets (16 mg) under the tongue daily     Cholecalciferol (VITAMIN D3 PO) Take 5,000 Units by mouth three times a week      diazepam (VALIUM) 5 MG tablet Take 1 tablet (5 mg) by mouth every 8 hours as needed for anxiety     DiphenhydrAMINE HCl (BENADRYL PO) Take 25-75 mg by mouth nightly as needed      diphenhydrAMINE-zinc acetate (BENADRYL) cream Apply topically 3 times daily as needed for itching     hydrOXYzine (ATARAX) 25 MG tablet Take 1-2 tablets (25-50 mg) by mouth every 4 hours as needed for itching or anxiety     nicotine (NICOTROL) 10 MG/ML SOLN inhalation solution Spray 1 spray in nostril every hour as needed for smoking cessation     senna-docusate (SENOKOT-S;PERICOLACE) 8.6-50 MG per tablet Take 2 tablets by mouth daily     sucralfate (CARAFATE) 1 GM tablet Take 1 g by mouth 4 times daily as needed     SUMAtriptan Succinate (IMITREX PO) Take 100 mg by mouth daily as needed for migraine (May take 1 more tablet 2 hours after first dose if no relief)     No current facility-administered medications for this visit.               Review of Systems:   A comprehensive 10 point review of systems (constitutional, ENT, cardiac, peripheral vascular, lymphatic, respiratory, GI, , Musculoskeletal, skin, Neurological) was performed and found to be negative except as described in this note.     See intake form completed by patient

## 2020-12-23 ENCOUNTER — OFFICE VISIT (OUTPATIENT)
Dept: ORTHOPEDICS | Facility: CLINIC | Age: 50
End: 2020-12-23
Payer: MEDICARE

## 2020-12-23 ENCOUNTER — PRE VISIT (OUTPATIENT)
Dept: ORTHOPEDICS | Facility: CLINIC | Age: 50
End: 2020-12-23

## 2020-12-23 VITALS — WEIGHT: 215 LBS | HEIGHT: 78 IN | BODY MASS INDEX: 24.88 KG/M2

## 2020-12-23 DIAGNOSIS — S82.241K: Primary | ICD-10-CM

## 2020-12-23 PROBLEM — S09.90XA INJURY OF HEAD: Status: ACTIVE | Noted: 2017-06-05

## 2020-12-23 PROBLEM — E55.9 VITAMIN D INSUFFICIENCY: Status: ACTIVE | Noted: 2020-01-16

## 2020-12-23 PROBLEM — Z79.899 OTHER LONG TERM (CURRENT) DRUG THERAPY: Status: ACTIVE | Noted: 2017-02-08

## 2020-12-23 PROBLEM — Z98.890 S/P NASAL SEPTOPLASTY: Status: ACTIVE | Noted: 2019-11-12

## 2020-12-23 PROBLEM — R22.41 FOOT MASS, RIGHT: Status: ACTIVE | Noted: 2020-07-22

## 2020-12-23 PROBLEM — S82.301K: Status: ACTIVE | Noted: 2020-12-09

## 2020-12-23 PROCEDURE — 99204 OFFICE O/P NEW MOD 45 MIN: CPT | Performed by: STUDENT IN AN ORGANIZED HEALTH CARE EDUCATION/TRAINING PROGRAM

## 2020-12-23 RX ORDER — IBUPROFEN 600 MG/1
TABLET, FILM COATED ORAL
COMMUNITY
Start: 2020-12-01

## 2020-12-23 RX ORDER — ACETAMINOPHEN 325 MG/1
975 TABLET ORAL
COMMUNITY
Start: 2020-01-17

## 2020-12-23 RX ORDER — CLONAZEPAM 1 MG/1
TABLET ORAL
COMMUNITY
Start: 2020-12-09

## 2020-12-23 RX ORDER — IBUPROFEN 200 MG
500 CAPSULE ORAL
COMMUNITY
Start: 2020-01-17

## 2020-12-23 RX ORDER — ESZOPICLONE 3 MG/1
3 TABLET, FILM COATED ORAL
COMMUNITY

## 2020-12-23 RX ORDER — ASPIRIN 81 MG
TABLET,CHEWABLE ORAL
COMMUNITY
Start: 2020-05-12

## 2020-12-23 ASSESSMENT — MIFFLIN-ST. JEOR: SCORE: 1958.48

## 2020-12-23 NOTE — NURSING NOTE
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 Seen at [facility]       Primary MD: Kiah Hightower    Date of surgery:TKA right, DOS 1/16/2020 ORIF right tibia  Smoker: No  Request smoking cessation information: No    Ht 1.981 m (6' 6\")   Wt 97.5 kg (215 lb)   BMI 24.85 kg/m      Pain Assessment  Patient Currently in Pain: Yes  0-10 Pain Scale: 5(Wife said it is greater than 5)  Primary Pain Location: Leg(Right)          Daniela Redman ATC    "

## 2020-12-23 NOTE — LETTER
12/23/2020         RE: Brendan Collins  [address]  [address]  [facility] [id number]        Dear Colleague,    Thank you for referring your patient, Brendan Collins, to the [facility] [place]. Please see a copy of my visit note below.        [facility]  Orthopaedic Surgery Consultation by Alli Harris M.D.    Brendan Collins MRN# [id number]   Age: 50 year old YOB: 1970     Requesting physician: No ref. provider found  Kiah Hightower     Background history:  DX:  1. Traumatic brain injury  2. Cognitive disorder  3. Nicotine dependence, resolved  4. Drug dependence  5. Hepatitis C  6. MND deficiency  7. Depressive disorder  8. Bipolar disorder  9. PTSD  10. Left total knee arthroplasty    TREATMENTS:  1/16/2020, ORIF right tibial shaft fracture, Dr. 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  Today's history was taken with the help of his girlfriend who is in clinic as well.  In January 2020 patient had a ground-level fall and fractured his right tibia and fibula.  In the postoperative phase he continued to experience persistent pain.  During follow-up it was noted that the screws used through the 3.5 Cabrera plate were all broken.  During the last visit with Dr. Koroma a CT scan showed ample callus formation but minimal bony bridging of the fracture elements.  A bone stimulator was prescribed.  Clinically there are no signs of any infection.  The time of surgery the wound has healed nicely.  Patient ambulates weightbearing as tolerated  without any assistive devices or external immobilizers.  Pain is present continuously and gets worse during weightbearing.  He uses opioids to mitigate the pain.  The patient denies smoking or having being diagnosed with diabetes mellitus.      Social:   Occupation:   Living situation: Independently and alone but has been staying with girlfriend since January 2020.  Hobbies / Sports:     Smoking: No  Alcohol: Yes  Illicit drug use: Cannabis         Physical Exam:     EXAMINATION pertinent findings:   PSYCH: Pleasant, healthy-appearing, alert, oriented x3, cooperative. Normal mood and affect.  VITAL SIGNS: There were no vitals taken for this visit.  Reviewed nursing intake notes.   There is no height or weight on file to calculate BMI.  RESP: non labored breathing   ABD: benign, soft, non-tender, no acute peritoneal findings  SKIN: grossly normal   LYMPHATIC: grossly normal, no adenopathy, no extremity edema  NEURO: grossly normal , no motor deficits  VASCULAR: satisfactory perfusion of all extremities   MUSCULOSKELETAL:   Alignment: Neutral alignment of the right lower extremity.  The left tibia has a slight valgus alignment.  Minimal external rotation deformity compared to contralateral side.  Gait: Antalgic over right lower extremity with shortened stance.    The incision over the right tibia is well-healed.  No signs of infection.  The proximal part of the plate is prominent underneath the skin on the medial facet of the tibia.  Hypertrophic callus formation palpable at the site of the fracture.    Right LE:   Thigh and leg compartments soft and compressible   +Quad/TA/GSC/FHL/EHL   SILT DP/SP/Neela/Saph/Tib nerve distributions   Palpable dorsalis pedis pulse            Data:   All laboratory data reviewed  All imaging studies reviewed by me personally.    XR tibia right 12/23/2020:  Status post ORIF distal tibial fracture.  Appearance of hypertrophic nonunion.  All screws in plate are broken.  CT right tibia  11/23/2020:  Status post ORIF distal tibial fracture.  Hypertrophic nonunion present.  No signs of bony bridging between fracture elements.  All screws in the plate are broken.         Assessment and Plan:   Assessment:  50-year-old male status post ORIF distal tibia fracture right with what appears to be a hypertrophic nonunion.  The most likely culprit for this hypertrophic nonunion would be a lack of stability of fracture elements.  Clinically a low index of suspicion of infection.      Plan:  Discussed my findings with the patient and his girlfriend.  It would be my recommendation to consider adding stability to the fracture elements.  This could be done externally by means of immobilization with boot or cast however I think it may be best to add stability internally.  Although the most conventional and reliable way of doing this would be to take down the nonunion and repair it with a new plate and screws in this particular case it may be more optimal to use the multiple empty screw holes in the current plate.  This would minimize the surgical dissection and thus risk of infection, could be performed in a day surgery setting.  Postoperatively I would advise to add external immobilization by means of a walking boot and weight-bear as tolerated.  Patient and girlfriend understand the proposal as set forth and will think about pursuing this option.  In the meantime I will discuss my thoughts with Dr. Koroma as well.  All questions were answered to the best of my ability.    Thank you for your referral.      Alli Harris MD, PhD     Adult Reconstruction  [facility] Department of Orthopaedic Surgery  Pager [phone]      DATA for DOCUMENTATION:         Past Medical History:     Patient Active Problem List   Diagnosis     Knee pain     Nasal obstruction     Depression     Total knee replacement status     Mood disorder (H)     Bipolar affective disorder (H)     Past Medical  History:   Diagnosis Date     Anxiety      Bipolar 1 disorder (H)      Depressive disorder      Hep C w/o coma, chronic (H)      Migraine      Opiate abuse, episodic      PTSD (post-traumatic stress disorder)      TBI (traumatic brain injury) (H)        Also see scanned health assessment forms.       Past Surgical History:     Past Surgical History:   Procedure Laterality Date     ARTHROPLASTY KNEE Right 11/9/2015    Procedure: ARTHROPLASTY KNEE;  Surgeon: Elijah Zuniga MD;  Location:  OR     ARTHROSCOPY KNEE  2/1/2012    Procedure:ARTHROSCOPY KNEE; Left Knee Arthroscopy, Partial Lateral Meniscectomy; Surgeon:ELIJAH ZUNIGA; Location: OR            Social History:     Social History     Socioeconomic History     Marital status: Single     Spouse name: Not on file     Number of children: Not on file     Years of education: Not on file     Highest education level: Not on file   Occupational History     Not on file   Social Needs     Financial resource strain: Not on file     Food insecurity     Worry: Not on file     Inability: Not on file     Transportation needs     Medical: Not on file     Non-medical: Not on file   Tobacco Use     Smoking status: Current Some Day Smoker     Smokeless tobacco: Never Used   Substance and Sexual Activity     Alcohol use: Yes     Comment: on weekends,Last dran was last night     Drug use: Yes     Types: Marijuana     Comment: Last used 2 days ago     Sexual activity: Yes   Lifestyle     Physical activity     Days per week: Not on file     Minutes per session: Not on file     Stress: Not on file   Relationships     Social connections     Talks on phone: Not on file     Gets together: Not on file     Attends Tenriism service: Not on file     Active member of club or organization: Not on file     Attends meetings of clubs or organizations: Not on file     Relationship status: Not on file     Intimate partner violence     Fear of current or ex partner: Not on file      Emotionally abused: Not on file     Physically abused: Not on file     Forced sexual activity: Not on file   Other Topics Concern     Parent/sibling w/ CABG, MI or angioplasty before 65F 55M? Not Asked   Social History Narrative     Not on file            Family History:       Family History   Problem Relation Age of Onset     Glaucoma No family hx of      Macular Degeneration No family hx of      Thyroid Disease No family hx of      Eye Surgery No family hx of      Retinal detachment No family hx of      Amblyopia No family hx of      Strabismus No family hx of             Medications:     Current Outpatient Medications   Medication Sig     aspirin  MG EC tablet Take 2 tablets (650 mg) by mouth 2 times daily as needed for moderate pain     buprenorphine (SUBUTEX) 8 MG SUBL sublingual tablet Place 2 tablets (16 mg) under the tongue daily     Cholecalciferol (VITAMIN D3 PO) Take 5,000 Units by mouth three times a week      diazepam (VALIUM) 5 MG tablet Take 1 tablet (5 mg) by mouth every 8 hours as needed for anxiety     DiphenhydrAMINE HCl (BENADRYL PO) Take 25-75 mg by mouth nightly as needed      diphenhydrAMINE-zinc acetate (BENADRYL) cream Apply topically 3 times daily as needed for itching     hydrOXYzine (ATARAX) 25 MG tablet Take 1-2 tablets (25-50 mg) by mouth every 4 hours as needed for itching or anxiety     nicotine (NICOTROL) 10 MG/ML SOLN inhalation solution Spray 1 spray in nostril every hour as needed for smoking cessation     senna-docusate (SENOKOT-S;PERICOLACE) 8.6-50 MG per tablet Take 2 tablets by mouth daily     sucralfate (CARAFATE) 1 GM tablet Take 1 g by mouth 4 times daily as needed     SUMAtriptan Succinate (IMITREX PO) Take 100 mg by mouth daily as needed for migraine (May take 1 more tablet 2 hours after first dose if no relief)     No current facility-administered medications for this visit.               Review of Systems:   A comprehensive 10 point review of systems  (constitutional, ENT, cardiac, peripheral vascular, lymphatic, respiratory, GI, , Musculoskeletal, skin, Neurological) was performed and found to be negative except as described in this note.     See intake form completed by patient        Alli Harris MD

## 2021-01-25 ENCOUNTER — HOSPITAL ENCOUNTER (OUTPATIENT)
Dept: LAB | Facility: CLINIC | Age: 51
Discharge: HOME | End: 2021-01-25
Admitting: PHYSICIAN ASSISTANT
Payer: MEDICARE

## 2021-01-25 DIAGNOSIS — M25.579 PAIN IN JOINT INVOLVING ANKLE AND FOOT, UNSPECIFIED LATERALITY: ICD-10-CM

## 2021-01-25 DIAGNOSIS — M25.579 PAIN IN JOINT INVOLVING ANKLE AND FOOT, UNSPECIFIED LATERALITY: Primary | ICD-10-CM

## 2021-01-25 LAB
CRP SERPL-MCNC: 10.3 MG/L (ref 0–8)
ERYTHROCYTE [SEDIMENTATION RATE] IN BLOOD BY WESTERGREN METHOD: 10 MM/H (ref 0–20)

## 2021-01-25 PROCEDURE — 85652 RBC SED RATE AUTOMATED: CPT | Performed by: PHYSICIAN ASSISTANT

## 2021-01-25 PROCEDURE — 86140 C-REACTIVE PROTEIN: CPT | Performed by: PHYSICIAN ASSISTANT

## 2021-01-25 PROCEDURE — 36415 COLL VENOUS BLD VENIPUNCTURE: CPT | Performed by: PHYSICIAN ASSISTANT

## 2024-02-23 ENCOUNTER — HOSPITAL ENCOUNTER (EMERGENCY)
Facility: CLINIC | Age: 54
Discharge: HOME | End: 2024-02-23
Attending: EMERGENCY MEDICINE | Admitting: EMERGENCY MEDICINE
Payer: MEDICARE

## 2024-02-23 VITALS
BODY MASS INDEX: 26.31 KG/M2 | WEIGHT: 205 LBS | HEIGHT: 74 IN | DIASTOLIC BLOOD PRESSURE: 71 MMHG | TEMPERATURE: 97.9 F | RESPIRATION RATE: 20 BRPM | OXYGEN SATURATION: 100 % | SYSTOLIC BLOOD PRESSURE: 115 MMHG | HEART RATE: 79 BPM

## 2024-02-23 DIAGNOSIS — S90.424A BLISTER OF TOE OF RIGHT FOOT WITHOUT INFECTION, INITIAL ENCOUNTER: ICD-10-CM

## 2024-02-23 DIAGNOSIS — G62.9 NEUROPATHY: ICD-10-CM

## 2024-02-23 PROCEDURE — 99283 EMERGENCY DEPT VISIT LOW MDM: CPT | Performed by: EMERGENCY MEDICINE

## 2024-02-23 PROCEDURE — 99284 EMERGENCY DEPT VISIT MOD MDM: CPT | Performed by: EMERGENCY MEDICINE

## 2024-02-23 RX ORDER — PREGABALIN 25 MG/1
25 CAPSULE ORAL 2 TIMES DAILY
Qty: 30 CAPSULE | Refills: 0 | Status: SHIPPED | OUTPATIENT
Start: 2024-02-23

## 2024-02-23 ASSESSMENT — COLUMBIA-SUICIDE SEVERITY RATING SCALE - C-SSRS
2. HAVE YOU ACTUALLY HAD ANY THOUGHTS OF KILLING YOURSELF IN THE PAST MONTH?: NO
1. IN THE PAST MONTH, HAVE YOU WISHED YOU WERE DEAD OR WISHED YOU COULD GO TO SLEEP AND NOT WAKE UP?: NO
6. HAVE YOU EVER DONE ANYTHING, STARTED TO DO ANYTHING, OR PREPARED TO DO ANYTHING TO END YOUR LIFE?: NO

## 2024-02-23 ASSESSMENT — ACTIVITIES OF DAILY LIVING (ADL): ADLS_ACUITY_SCORE: 35

## 2024-02-23 NOTE — ED TRIAGE NOTES
Rt leg burning, knee and foot surgery back in 2020.     Triage Assessment (Adult)       Row Name 02/23/24 2150          Triage Assessment    Airway WDL WDL        Respiratory WDL    Respiratory WDL WDL        Skin Circulation/Temperature WDL    Skin Circulation/Temperature WDL WDL        Cardiac WDL    Cardiac WDL WDL        Peripheral/Neurovascular WDL    Peripheral Neurovascular WDL X;neurovascular assessment lower        Cognitive/Neuro/Behavioral WDL    Cognitive/Neuro/Behavioral WDL WDL        RLE Neurovascular Assessment    Color RLE red     Sensation RLE --  burning

## 2024-02-23 NOTE — ED PROVIDER NOTES
ED Provider Note  [facility]      History     Chief Complaint   Patient presents with    Leg Pain     Rt leg burning, knee and foot surgery back in 2020.     The history is provided by the patient and medical records.     Brendan Collins is a 53 year old male with history of bipolar disorder, multiple TBIs, PTSD, and a closed tib-fib fracture in 2020 treated with ORIF now with neuropathy who presents to the ED with bilateral foot pain. Patient reports that he recently split with his girlfriend who he has been living with for 10 years so has had to move back to his apartment. He states that his apartment needs a lot of cleaning which has led him to be on his feet and walking a lot. He now has an open blister on the bottom of the right great toe as well as burning, stabbing pain in both feet. This feels like his neuropathy which he has been prescribed gabapentin for, but did not like so stopped taking. He has been unable to sleep because of the pain. He is requesting that his feet be cleaned and states that he does do this at home, but they need sanitized and sterilized now. He denies SI or SIB.    Past Medical History  Past Medical History:   Diagnosis Date    Anxiety     Bipolar 1 disorder (H)     Depressive disorder     Hep C w/o coma, chronic (H)     Migraine     Opiate abuse, episodic (H)     PTSD (post-traumatic stress disorder)     TBI (traumatic brain injury) (H)      Past Surgical History:   Procedure Laterality Date    ARTHROPLASTY KNEE Right 11/9/2015    Procedure: ARTHROPLASTY KNEE;  Surgeon: Elijah Zuniga MD;  Location:  OR    ARTHROSCOPY KNEE  2/1/2012    Procedure:ARTHROSCOPY KNEE; Left Knee Arthroscopy, Partial Lateral Meniscectomy; Surgeon:ELIJAH ZUNIGA; Location: OR     pregabalin (LYRICA) 25 MG capsule  acetaminophen (TYLENOL) 325 MG tablet  aspirin  MG EC tablet  buprenorphine (SUBUTEX) 8 MG SUBL sublingual tablet  Buprenorphine HCl-Naloxone  "HCl (SUBOXONE SL)  calcium carbonate 500 mg, elemental, (OSCAL 500) 1250 (500 Ca) MG TABS tablet  Capsaicin 0.1 % cream  Cholecalciferol (VITAMIN D3 PO)  clonazePAM (KLONOPIN) 1 MG tablet  diazepam (VALIUM) 5 MG tablet  DiphenhydrAMINE HCl (BENADRYL PO)  diphenhydrAMINE-zinc acetate (BENADRYL) cream  DOK PLUS 50-8.6 MG tablet  eszopiclone (LUNESTA) 3 MG tablet  hydrOXYzine (ATARAX) 25 MG tablet  ibuprofen (ADVIL/MOTRIN) 600 MG tablet  medical cannabis (Patient's own supply)  nicotine (NICOTROL) 10 MG/ML SOLN inhalation solution  saline 0.65 % (Soln) SOLN  senna-docusate (SENOKOT-S;PERICOLACE) 8.6-50 MG per tablet  sucralfate (CARAFATE) 1 GM tablet  SUMAtriptan Succinate (IMITREX PO)      Allergies   Allergen Reactions    Droperidol Other (See Comments)     Spine turns to right and gets stiff, unable to move    Haloperidol Lactate Other (See Comments)     Spine turns to right becomes stiff and is unable to move    Lithium Anxiety and Other (See Comments)     Skakiness  Skakiness  Tremor      Nefazodone Difficulty breathing    Quetiapine Anaphylaxis and Other (See Comments)     congestion  difficulty breathing through nose. [facility]Record#[id number]      Rofecoxib Difficulty breathing    Trazodone Difficulty breathing    Lamotrigine Rash    Naproxen Nausea and Vomiting    Carbamazepine Other (See Comments)    Gabapentin Other (See Comments)     Migraine and shaking    Oxcarbazepine      ''Shaking'', ''Confusion''. [facility]#[id number]    Prazosin      agitated. [facility]#[id number]    Risperidone Other (See Comments)     \"My spine curved from it\"  Patient said his spine twisted.  dystonic reactions. [facility]Record#[id number]      Valproic Acid      hair falls out. [facility]Record#[id number]     Family History  Family History   Problem Relation Age of Onset    Glaucoma No family hx of     Macular Degeneration No family hx of     Thyroid Disease No family hx of     Eye Surgery No family hx of     Retinal detachment No family hx of     Amblyopia No " "family hx of     Strabismus No family hx of      Social History   Social History     Tobacco Use    Smoking status: Some Days    Smokeless tobacco: Never   Substance Use Topics    Alcohol use: Yes     Comment: on weekends,Last dran was last night    Drug use: Yes     Types: Marijuana     Comment: Last used 2 days ago         A medically appropriate review of systems was performed with pertinent positives and negatives noted in the HPI, and all other systems negative.    Physical Exam   BP: 115/71  Pulse: 79  Temp: 97.9  F (36.6  C)  Resp: 20  Height: 188 cm (6' 2\")  Weight: 93 kg (205 lb)  SpO2: 100 %  Physical Exam  Vitals and nursing note reviewed.   Constitutional:       General: He is not in acute distress.     Appearance: He is well-developed.   HENT:      Head: Normocephalic.      Right Ear: External ear normal.      Left Ear: External ear normal.      Nose: Nose normal.   Eyes:      Extraocular Movements: Extraocular movements intact.      Conjunctiva/sclera: Conjunctivae normal.   Pulmonary:      Effort: Pulmonary effort is normal. No respiratory distress.   Abdominal:      General: Abdomen is flat. There is no distension.   Musculoskeletal:         General: No deformity. Normal range of motion.      Cervical back: Normal range of motion and neck supple.   Skin:     General: Skin is warm and dry.      Comments: Right great toe there is a popped blister with mild tenderness.  No evidence of cellulitis or active infection.   Neurological:      Mental Status: He is alert. Mental status is at baseline.      Comments: Oriented   Psychiatric:         Mood and Affect: Mood normal.         Behavior: Behavior normal.           ED Course, Procedures, & Data      Procedures                     No results found for any visits on 02/23/24.  Medications - No data to display  Labs Ordered and Resulted from Time of ED Arrival to Time of ED Departure - No data to display  No orders to display          Medical Decision " Making  The patient's presentation is strongly suggestive of low complexity (an acute and uncomplicated illness or injury).    The patient's evaluation involved:  review of external note(s) from 3+ sources (Most recent H&P in addition to clinic and ED note)  review of 2 test result(s) ordered prior to this encounter (Most recent BMP and CBC)    The patient's management involved moderate risk (prescription drug management including medications given in the ED).    Assessment & Plan    53-year-old male presents to us with a chief complaint of toe pain.  He has been hard at work cleaning his apartment and developed a blister on the bottom of his right great toe.  The blister is popped.  There is no evidence of any active infection and it is otherwise clean.  He did ask us to further sterilize it which I did with a ChloraPrep.  He also notes persistent neuropathy in his bilateral feet that been present for some time.  He says gabapentin does not work.  Will give him prescription for Lyrica for him to try and see if it gives him better relief.    I have reviewed the nursing notes. I have reviewed the findings, diagnosis, plan and need for follow up with the patient.    New Prescriptions    PREGABALIN (LYRICA) 25 MG CAPSULE    Take 1 capsule (25 mg) by mouth 2 times daily       Final diagnoses:   Blister of toe of right foot without infection, initial encounter   Neuropathy   I, Antonette Vieyra, am serving as a trained medical scribe to document services personally performed by Loy Berry DO, based on the provider's statements to me.     I, Loy Berry DO, was physically present and have reviewed and verified the accuracy of this note documented by Antonette Vieyra.      Loy Berry DO  [facility] EMERGENCY DEPARTMENT  2/23/2024     Loy Berry DO  02/23/24 [id number]

## 2024-02-29 ENCOUNTER — HOSPITAL ENCOUNTER (EMERGENCY)
Facility: CLINIC | Age: 54
Discharge: HOME | End: 2024-02-29
Attending: EMERGENCY MEDICINE | Admitting: EMERGENCY MEDICINE
Payer: MEDICARE

## 2024-02-29 VITALS
TEMPERATURE: 97.4 F | DIASTOLIC BLOOD PRESSURE: 83 MMHG | SYSTOLIC BLOOD PRESSURE: 126 MMHG | OXYGEN SATURATION: 95 % | HEART RATE: 104 BPM | RESPIRATION RATE: 18 BRPM
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
## 2024-02-29 DIAGNOSIS — S01.81XA CHIN LACERATION, INITIAL ENCOUNTER: ICD-10-CM

## 2024-02-29 DIAGNOSIS — S61.011A THUMB LACERATION, RIGHT, INITIAL ENCOUNTER: ICD-10-CM

## 2024-02-29 DIAGNOSIS — F10.920 ALCOHOLIC INTOXICATION WITHOUT COMPLICATION (H): ICD-10-CM

## 2024-02-29 LAB — ALCOHOL BREATH TEST: 0.24 (ref 0–0.01)

## 2024-02-29 PROCEDURE — 90471 IMMUNIZATION ADMIN: CPT | Performed by: EMERGENCY MEDICINE

## 2024-02-29 PROCEDURE — 90715 TDAP VACCINE 7 YRS/> IM: CPT | Performed by: EMERGENCY MEDICINE

## 2024-02-29 PROCEDURE — 99283 EMERGENCY DEPT VISIT LOW MDM: CPT | Mod: 25 | Performed by: EMERGENCY MEDICINE

## 2024-02-29 PROCEDURE — 99283 EMERGENCY DEPT VISIT LOW MDM: CPT | Performed by: EMERGENCY MEDICINE

## 2024-02-29 PROCEDURE — 250N000011 HC RX IP 250 OP 636: Performed by: EMERGENCY MEDICINE

## 2024-02-29 RX ADMIN — CLOSTRIDIUM TETANI TOXOID ANTIGEN (FORMALDEHYDE INACTIVATED), CORYNEBACTERIUM DIPHTHERIAE TOXOID ANTIGEN (FORMALDEHYDE INACTIVATED), BORDETELLA PERTUSSIS TOXOID ANTIGEN (GLUTARALDEHYDE INACTIVATED), BORDETELLA PERTUSSIS FILAMENTOUS HEMAGGLUTININ ANTIGEN (FORMALDEHYDE INACTIVATED), BORDETELLA PERTUSSIS PERTACTIN ANTIGEN, AND BORDETELLA PERTUSSIS FIMBRIAE 2/3 ANTIGEN 0.5 ML: 5; 2; 2.5; 5; 3; 5 INJECTION, SUSPENSION INTRAMUSCULAR at 19:13

## 2024-02-29 ASSESSMENT — COLUMBIA-SUICIDE SEVERITY RATING SCALE - C-SSRS
6. HAVE YOU EVER DONE ANYTHING, STARTED TO DO ANYTHING, OR PREPARED TO DO ANYTHING TO END YOUR LIFE?: YES
1. IN THE PAST MONTH, HAVE YOU WISHED YOU WERE DEAD OR WISHED YOU COULD GO TO SLEEP AND NOT WAKE UP?: NO
2. HAVE YOU ACTUALLY HAD ANY THOUGHTS OF KILLING YOURSELF IN THE PAST MONTH?: NO

## 2024-02-29 ASSESSMENT — ACTIVITIES OF DAILY LIVING (ADL)
ADLS_ACUITY_SCORE: 33
ADLS_ACUITY_SCORE: 33
ADLS_ACUITY_SCORE: 35

## 2024-02-29 NOTE — ED PROVIDER NOTES
[facility] EMERGENCY DEPARTMENT ([facility])    2/29/24      ED PROVIDER NOTE   History     Chief Complaint   Patient presents with    Alcohol Intoxication     Patient had two falls today due to intoxication, lac on chin and thumb, called 911 and told them to take him to [place].     Fall           Laceration     Laceration to chin     HPI  Brendan Collins is a 53 year old male with a past medical history of bipolar disorder, mood disorder, cannabis abuse, organic brain syndrome, personality disorder, PTSD, and history of opioid dependence, who presents to the Emergency Department today after falling. Patient reportedly tripped over the concrete in the parking lot, falling forward and landing on his chin. He does report that he did lose consciousness for an unspecified period of time. Patient does report that he had a headache prior to his fall, as he does have a history of migraines and had taken some sumatriptan earlier. He continues to have a headache at present, and is noting changes in color vision. He also is noticing some neck pain. Denies arm pain or tingling or numbness. He states that he has some left-sided rib pain but tells me that he knows he has a rib fracture on that side. He denies any shortness of breath. Denies any back pain. Denies any abdominal pain. He does have a small laceration to his chin and another laceration to his right thumb. Unknown last tetanus status.    Patient is difficult to get a clear history from, does appear to potentially be under the influence of substances and has a slurred speech. He reports having 3 alcoholic beverages today.    Patient is not interested in having any further evaluation, is not interested in having his wounds repaired, simply wants them washed out.     Past Medical History  Past Medical History:   Diagnosis Date    Anxiety     Bipolar 1 disorder (H)     Depressive disorder     Hep C w/o coma, chronic (H)     Migraine     Opiate abuse,  episodic (H)     PTSD (post-traumatic stress disorder)     TBI (traumatic brain injury) (H)      Past Surgical History:   Procedure Laterality Date    ARTHROPLASTY KNEE Right 11/9/2015    Procedure: ARTHROPLASTY KNEE;  Surgeon: Elijah Zuniga MD;  Location:  OR    ARTHROSCOPY KNEE  2/1/2012    Procedure:ARTHROSCOPY KNEE; Left Knee Arthroscopy, Partial Lateral Meniscectomy; Surgeon:ELIJAH ZUNIGA; Location: OR     acetaminophen (TYLENOL) 325 MG tablet  aspirin  MG EC tablet  buprenorphine (SUBUTEX) 8 MG SUBL sublingual tablet  Buprenorphine HCl-Naloxone HCl (SUBOXONE SL)  calcium carbonate 500 mg, elemental, (OSCAL 500) 1250 (500 Ca) MG TABS tablet  Capsaicin 0.1 % cream  Cholecalciferol (VITAMIN D3 PO)  clonazePAM (KLONOPIN) 1 MG tablet  diazepam (VALIUM) 5 MG tablet  DiphenhydrAMINE HCl (BENADRYL PO)  diphenhydrAMINE-zinc acetate (BENADRYL) cream  DOK PLUS 50-8.6 MG tablet  eszopiclone (LUNESTA) 3 MG tablet  hydrOXYzine (ATARAX) 25 MG tablet  ibuprofen (ADVIL/MOTRIN) 600 MG tablet  medical cannabis (Patient's own supply)  nicotine (NICOTROL) 10 MG/ML SOLN inhalation solution  pregabalin (LYRICA) 25 MG capsule  saline 0.65 % (Soln) SOLN  senna-docusate (SENOKOT-S;PERICOLACE) 8.6-50 MG per tablet  sucralfate (CARAFATE) 1 GM tablet  SUMAtriptan Succinate (IMITREX PO)      Allergies   Allergen Reactions    Droperidol Other (See Comments)     Spine turns to right and gets stiff, unable to move    Haloperidol Lactate Other (See Comments)     Spine turns to right becomes stiff and is unable to move    Lithium Anxiety and Other (See Comments)     Skakiness  Skakiness  Tremor      Nefazodone Difficulty breathing    Quetiapine Anaphylaxis and Other (See Comments)     congestion  difficulty breathing through nose. LegacyRecord#[id number]      Rofecoxib Difficulty breathing    Trazodone Difficulty breathing    Lamotrigine Rash    Naproxen Nausea and Vomiting    Carbamazepine Other (See Comments)    Gabapentin  "Other (See Comments)     Migraine and shaking    Oxcarbazepine      ''Shaking'', ''Confusion''. LegacyRecord#[id number]    Prazosin      agitated. LegacyRecord#[id number]    Risperidone Other (See Comments)     \"My spine curved from it\"  Patient said his spine twisted.  dystonic reactions. LegacyRecord#[id number]      Valproic Acid      hair falls out. LegacyRecord#[id number]     Family History  Family History   Problem Relation Age of Onset    Glaucoma No family hx of     Macular Degeneration No family hx of     Thyroid Disease No family hx of     Eye Surgery No family hx of     Retinal detachment No family hx of     Amblyopia No family hx of     Strabismus No family hx of      Social History   Social History     Tobacco Use    Smoking status: Some Days    Smokeless tobacco: Never   Substance Use Topics    Alcohol use: Yes     Comment: on weekends,Last dran was last night    Drug use: Yes     Types: Marijuana     Comment: Last used 2 days ago         A complete review of systems was attempted but limited due to intoxication.     Physical Exam   BP: 126/83  Pulse: 104  Temp: 97.4  F (36.3  C)  Resp: 18  SpO2: 95 %  Physical Exam  Vitals and nursing note reviewed.   Constitutional:       General: He is not in acute distress.     Appearance: Normal appearance. He is not toxic-appearing.      Comments: Intoxicated, slurred speech, irritable.   HENT:      Head:      Comments: Small laceration noted over chin, minimal bleeding at this time  Eyes:      General: No scleral icterus.     Conjunctiva/sclera: Conjunctivae normal.   Neck:      Comments: Tenderness to palpation over posterior inferior cervical spine.  Cardiovascular:      Rate and Rhythm: Normal rate.      Heart sounds: Normal heart sounds.   Pulmonary:      Effort: Pulmonary effort is normal. No respiratory distress.      Breath sounds: Normal breath sounds.   Abdominal:      Palpations: Abdomen is soft.      Tenderness: There is no abdominal tenderness. There is no guarding or " rebound.   Musculoskeletal:         General: No deformity.      Cervical back: Neck supple.   Skin:     General: Skin is warm.      Comments: Flap type laceration noted over the radial aspect of the right thumb along the nail.  Mild oozing noted.  The flap appears to be devascularized and white.  Nail itself is not involved.  Normal range of motion of IP, MCP joints.  Sensation intact.   Neurological:      General: No focal deficit present.      Mental Status: He is alert.      GCS: GCS eye subscore is 4. GCS verbal subscore is 5. GCS motor subscore is 6.      Cranial Nerves: Cranial nerves 2-12 are intact.      Sensory: Sensation is intact.      Motor: Motor function is intact.      Comments: Slurred speech.  No focal deficits.  Some staggering noted with gait.   Psychiatric:      Comments: Irritable, unkempt.  Argumentative.  Not physically aggressive.  No SI/HI.  Minimizing alcohol use.           ED Course, Procedures, & Data      Procedures              Results for orders placed or performed during the hospital encounter of 02/29/24   Alcohol breath test POCT     Status: Abnormal   Result Value Ref Range    Alcohol Breath Test 0.244 (A) 0.00 - 0.01     Medications   Tdap (tetanus-diphtheria-acell pertussis) (ADACEL) injection 0.5 mL (0.5 mLs Intramuscular $Given 2/29/24 1913)     Labs Ordered and Resulted from Time of ED Arrival to Time of ED Departure   ALCOHOL BREATH TEST POCT - Abnormal       Result Value    Alcohol Breath Test 0.244 (*)      No orders to display          Critical care was not performed.     Medical Decision Making  The patient's presentation was of high complexity (a chronic illness severe exacerbation, progression, or side effect of treatment).    The patient's evaluation involved:  review of external note(s) from 2 sources (see separate area of note for details)  strong consideration of a test (see separate area of note for details) that was ultimately deferred    The patient's management  necessitated only low risk treatment.    Assessment & Plan    This is a 53 year old male who presents with the above complaints. On my evaluation he is alert, has slurred speech, appears to be under the influence. He does have a small laceration to his chin, and another flap type laceration to the radial aspect of the right nail. This flap type laceration does appear to be devascularized and is white. There is a small amount of bleeding. Distal CMS is intact. He does have tenderness to palpation over the posterior C-spine, does not have any tenderness to palpation of repeat of the abdomen at this time.    As he does appear to be under the influence of sedating substances and does have head trauma, I recommended doing a head CT and a C-spine CT. Patient declined this. I explained the rationale behind getting CT & he continues to decline. The chin laceration could perhaps use 1 suture, and the flap type laceration of the radial aspect of his right thumb could use a couple of sutures to tack down the tissue, though it again is somewhat devitalized and I do expect that this flap may lose blood flow and necrosis.    I did ask staff to irrigate the wounds, but he declined to allow that as well, and only allowed us to place a Band-Aid on his thumb.  I had ordered a tetanus which she declined.    Breathalyzer here in the ED was 0.244.    The patient slept here in the emergency department for several hours.  Upon reassessment, he continues to insist that he does not want anything done, refuses to allow us to repair the wounds, refuses to allow us to do CT scans.  I had ordered a tetanus for him which he declined.    Patient will be discharged once clinically sober.  Instructed to follow-up with clinic.    I have reviewed the nursing notes. I have reviewed the findings, diagnosis, plan and need for follow up with the patient.    New Prescriptions    No medications on file       Final diagnoses:   Alcoholic intoxication  without complication (H24)   Fall, initial encounter   Chin laceration, initial encounter   Thumb laceration, right, initial encounter   This part of the medical record was transcribed by Pamella Cedillo, Medical Scribe, from a dictation done by Monique Gamez MD.      Monique Gamez MD  [facility] EMERGENCY DEPARTMENT  2/29/2024     Monique Gamez MD  02/29/24 0350

## 2024-02-29 NOTE — ED TRIAGE NOTES
Patient had two falls today due to intoxication, lac on chin and thumb, called 911 and told them to take him to [place].         Triage Assessment (Adult)       Row Name 02/29/24 5479          Triage Assessment    Airway WDL WDL        Respiratory WDL    Respiratory WDL WDL        Skin Circulation/Temperature WDL    Skin Circulation/Temperature WDL WDL        Cardiac WDL    Cardiac WDL WDL        Peripheral/Neurovascular WDL    Peripheral Neurovascular WDL WDL        Cognitive/Neuro/Behavioral WDL    Cognitive/Neuro/Behavioral WDL WDL        [place] Coma Scale    Best Eye Response 4-->(E4) spontaneous     Best Motor Response 6-->(M6) obeys commands     Best Verbal Response 5-->(V5) oriented     [place] Coma Scale Score 15

## 2024-02-29 NOTE — ED NOTES
Bed: ED12  Expected date:   Expected time:   Means of arrival:   Comments:  H 439 ETA < 5 mins  ETOH 50 M.

## 2024-03-01 NOTE — ED NOTES
Patient able to ambulate safely with his cane, patient requesting to be discharged. Patient still refusing to allow his lacerations to be cleaned, refused discharge vital signs.

## 2024-03-01 NOTE — DISCHARGE INSTRUCTIONS
You have been seen in the emergency department today after a fall.  We have recommended that we further evaluate you with a CT scan of the head and neck due to your fall and your symptoms but you have refused to allow this.  We have offered to repair your laceration with stitches but you have refused to allow this as well.  We recommend that you cover the area with antimicrobial ointment such as bacitracin.  Please keep bandages on the lacerations until they heal.    If you continue to have headache or neck pain that you should definitely follow-up with your primary clinic or return to the emergency department if you wish to be evaluated.    Please see substance abuse resources listed below.    Substance Use Disorder Direct Access Resources    It is recommended that you abstain from all mood altering chemicals. Please contact the sober support hotline ([phone]) as needed; phones are answered 24 hours a day, 7 days a week.    To access substance use treatment you must have a comprehensive assessment completed to begin any treatment program.     If uninsured, please contact your county of residence for eligibility screen to substance use disorder evaluation and treatment:    [facility] [phone]   [facility] [phone]   [facility] [phone]   [facility] [phone]   [facility] [phone]   [facility] [phone]   [facility] [phone]   [place] - [phone]     If you have private insurance, call the customer service number on the back of your insurance card to find an in-network substance abuse use disorder assessment. The ideal provider will be a treatment facility, licensed in the [facility] of [place].     Community TINA Evaluations: Clients may call their county for a full list of providers - Availability and services listed belo are subject to change, please call the provider to confirm    [facility]  [phone] [address], [place], [address]  *Please call the above number to  schedule a comprehensive assessment for determination of level of care needs. In person and virtual appointments available Mon-Fri.    [facility], [address] First Floor, [address], [place] [address] (next to the outpatient lab)    Phone: [phone]   Provides bridging services to people with Opiate Use Disorders (OUD) seeking care. This is a front door to Medication Assisted Treatments (MAT), ages 16+  Walk In hours: Monday-Friday 9:00am-3:00pm    [facility]  [phone]  Walk in Assessments: Mon-Friday 7a-1:45p  [address], [place], [address]    [facility]  Central Access [phone]  [address] [place], [place], [address]  *by appointment only    [facility]  [phone] (phone consultation available )  Locations in: [place], [facility], and [place], [place]  Lao virtual [facility] [phone] or visit Madeline[employer]/SAY   Also offers LGBTQ programming     [facility]  [phone]  [address]  [place], [place], [address]  *Currently only offered via telehealth - call to set up an appointment    [facility]  [address] [place] [address], [address]  [facility] Program  For more information to to make a referral call:  [phone]  Walk-in on   9-11 a.m.    [facility]  [phone]  [address] [place], [place], [address]  *available by appointments only    [facility]  [phone]  [address] [place], [place], [address]  *available by appointment only    [facility]  [phone] [address], [place], [address]  *walk in assessments available M-F starting at 7 am.    [facility]  [phone]  Locations: [facility], [facility], and [place]  *Walk in assessments availble M-F starting at 8 am -virtual only    [facility]  [phone]  [address] [place] [address]    [facility] Virtual + Locations: [place], [place], [place], [place], [facility], [place] [place], [place]   [phone]  *available by appointment only    [facility]  [phone]  [address]  [place], [place], [address]    [facility]  [phone]  [address], [place], [address]  *available by appointment    [facility]  [phone]  [address] [place], [place], [address]  *walk ins available M-TH from 9-3    [facility]  MAT program: [phone]  [address] [place], [place], [address]    [place]  [phone]  Same day substance use disorder assessments are available Monday - Friday, via walk-in or by appointment at the [place] location.  [address], [address], [place] [address]     [facility] - adolescent and adult SUDs services  [phone]  Offer services Monday through Friday, as well as evening hours Monday through Thursday. Normally, a first appointment will be scheduled within one week  [email or web address]  Locations all over [place]    If you are intoxicated, you may be required to detox at a detox facility before starting treatment. The following are detox facilities that you can self present to. All detox facilities are able to help you complete an assessment prior to discharge if you choose:    [facility]: Arrive at a [facility] for immediate medical evaluation    [facility]: [address], [place], [address].         [phone]    [facility]: [address] [place], [place], [address]  [phone]     [facility]): [address] [place], [place], [address]  [phone]     [facility]: [address] [place], [address], [phone]         Ways to help cope with  sobriety:    -- Take prescribed medicines as scheduled  -- Keep follow-up appointments  -- Talk to others about your concerns  -- Get regular exercise  -- Practice deep breathing skills  -- Eat a healthy diet  -- Use community resources, including hotline numbers, [facility] crisis and support meetings  -- Stay sober and avoid places/people/things associated with substance use  --Maintain a daily schedule/routine  --Get at least 7-8 hours of sleep per night  --Create a list 10--20 healthy activities that you can do that are enjoyable and do not involve substance use  --Create daily goals (approx. 1-4 goals) per day and work to achieve them throughout the day.       Free Resources:    [facility] ([facility])  [facility] connects people seeking recovery to resources that help foster and sustain long-term recovery. Whether you are seeking resources for treatment, transportation, housing, job training, education, health care or other pathways to recovery, [facility] is a great place to start.  Phone: [phone]. www.[employer].Dezide (Great listing of all types of recovery and non-recovery related resources)    Alcoholics Anonymous  Phone: 6-406-ALCOHOL  Website: HTTP://WWW.AA.ORG/  [facility] [place] (283-842-6230 or http://aaminnea"[employer]".org)  [facility] [place] (448-915-7421 or www.aastpaul.org)     Narcotics Anonymous  Phone: 683.135.2649  Website: www.[employer].    [employer] [address], [place], [place],  Phone: [phone]  Drop-in Hours: Monday-Friday 9-11:30 am. By appointment at other times.  Provides: [facility] is a drop-in center on the [place] side of [place] that provides a safe space for individuals who are homeless and have a history of chemical use. Sobriety is not a requirement but drugs and alcohol are not allowed on the property.  Services: Non-clients can access drop-in services such as Recovery and Harm Reduction Groups, referrals to case management, community  activities, shower facilities, and a pool table. Individuals who are homeless and have chemical health needs may be eligible for enrollment into [facility]'s case management program. Clients and  work together to access benefits, treatment, health care, shelter, and external housing resources.